# Patient Record
Sex: FEMALE | Race: BLACK OR AFRICAN AMERICAN | NOT HISPANIC OR LATINO | Employment: UNEMPLOYED | URBAN - METROPOLITAN AREA
[De-identification: names, ages, dates, MRNs, and addresses within clinical notes are randomized per-mention and may not be internally consistent; named-entity substitution may affect disease eponyms.]

---

## 2023-09-24 LAB
ACTIVATED PARTIAL THROMBOPLASTIN TIME IN PPP BY COAGULATION ASSAY: 24 SEC (ref 27–38)
ACTIVATED PARTIAL THROMBOPLASTIN TIME IN PPP BY COAGULATION ASSAY: 31 SEC (ref 27–38)
ALANINE AMINOTRANSFERASE (SGPT) (U/L) IN SER/PLAS: 22 U/L (ref 7–45)
ALBUMIN (G/DL) IN SER/PLAS: 3.6 G/DL (ref 3.4–5)
ALKALINE PHOSPHATASE (U/L) IN SER/PLAS: 62 U/L (ref 33–110)
ANION GAP IN SER/PLAS: 15 MMOL/L (ref 10–20)
ASPARTATE AMINOTRANSFERASE (SGOT) (U/L) IN SER/PLAS: 21 U/L (ref 9–39)
BASOPHILS (10*3/UL) IN BLOOD BY AUTOMATED COUNT: 0.02 X10E9/L (ref 0–0.1)
BASOPHILS/100 LEUKOCYTES IN BLOOD BY AUTOMATED COUNT: 0.3 % (ref 0–2)
BILIRUBIN TOTAL (MG/DL) IN SER/PLAS: 0.5 MG/DL (ref 0–1.2)
CALCIUM (MG/DL) IN SER/PLAS: 8.6 MG/DL (ref 8.6–10.6)
CARBON DIOXIDE, TOTAL (MMOL/L) IN SER/PLAS: 23 MMOL/L (ref 21–32)
CHLORIDE (MMOL/L) IN SER/PLAS: 110 MMOL/L (ref 98–107)
CREATININE (MG/DL) IN SER/PLAS: 0.79 MG/DL (ref 0.5–1.05)
D-DIMER, QUANTITATIVE VTE EXCLUSION: NORMAL
EOSINOPHILS (10*3/UL) IN BLOOD BY AUTOMATED COUNT: 0.31 X10E9/L (ref 0–0.7)
EOSINOPHILS/100 LEUKOCYTES IN BLOOD BY AUTOMATED COUNT: 4.3 % (ref 0–6)
ERYTHROCYTE DISTRIBUTION WIDTH (RATIO) BY AUTOMATED COUNT: 14.6 % (ref 11.5–14.5)
ERYTHROCYTE MEAN CORPUSCULAR HEMOGLOBIN CONCENTRATION (G/DL) BY AUTOMATED: 33.1 G/DL (ref 32–36)
ERYTHROCYTE MEAN CORPUSCULAR VOLUME (FL) BY AUTOMATED COUNT: 81 FL (ref 80–100)
ERYTHROCYTES (10*6/UL) IN BLOOD BY AUTOMATED COUNT: 3.97 X10E12/L (ref 4–5.2)
FIBRIN D-DIMER (NG/ML FEU) IN PLATELET POOR PLASMA: 1149 NG/ML FEU
FLU A RESULT: NOT DETECTED
FLU B RESULT: NOT DETECTED
GFR FEMALE: 88 ML/MIN/1.73M2
GLUCOSE (MG/DL) IN SER/PLAS: 106 MG/DL (ref 74–99)
HEMATOCRIT (%) IN BLOOD BY AUTOMATED COUNT: 32 % (ref 36–46)
HEMOGLOBIN (G/DL) IN BLOOD: 10.6 G/DL (ref 12–16)
IMMATURE GRANULOCYTES/100 LEUKOCYTES IN BLOOD BY AUTOMATED COUNT: 0.4 % (ref 0–0.9)
INR IN PPP BY COAGULATION ASSAY: 1 (ref 0.9–1.1)
INR IN PPP BY COAGULATION ASSAY: 1.1 (ref 0.9–1.1)
LEUKOCYTES (10*3/UL) IN BLOOD BY AUTOMATED COUNT: 7.2 X10E9/L (ref 4.4–11.3)
LYMPHOCYTES (10*3/UL) IN BLOOD BY AUTOMATED COUNT: 2.32 X10E9/L (ref 1.2–4.8)
LYMPHOCYTES/100 LEUKOCYTES IN BLOOD BY AUTOMATED COUNT: 32.1 % (ref 13–44)
MAGNESIUM (MG/DL) IN SER/PLAS: 1.97 MG/DL (ref 1.6–2.4)
MONOCYTES (10*3/UL) IN BLOOD BY AUTOMATED COUNT: 0.62 X10E9/L (ref 0.1–1)
MONOCYTES/100 LEUKOCYTES IN BLOOD BY AUTOMATED COUNT: 8.6 % (ref 2–10)
NATRIURETIC PEPTIDE B (PG/ML) IN SER/PLAS: 90 PG/ML (ref 0–99)
NEUTROPHILS (10*3/UL) IN BLOOD BY AUTOMATED COUNT: 3.93 X10E9/L (ref 1.2–7.7)
NEUTROPHILS/100 LEUKOCYTES IN BLOOD BY AUTOMATED COUNT: 54.3 % (ref 40–80)
NRBC (PER 100 WBCS) BY AUTOMATED COUNT: 0 /100 WBC (ref 0–0)
PATIENT TEMPERATURE: 37 DEGREES C
PLATELETS (10*3/UL) IN BLOOD AUTOMATED COUNT: 299 X10E9/L (ref 150–450)
POCT ANION GAP, VENOUS: 10 MMOL/L (ref 10–25)
POCT BASE EXCESS, VENOUS: 1.1 MMOL/L (ref -2–3)
POCT CHLORIDE, VENOUS: 108 MMOL/L (ref 98–107)
POCT GLUCOSE, VENOUS: 114 MG/DL (ref 74–99)
POCT HCO3, VENOUS: 27.1 MMOL/L (ref 22–26)
POCT HEMATOCRIT, VENOUS: 34 % (ref 36–46)
POCT HEMOGLOBIN, VENOUS: 11.3 G/DL (ref 12–16)
POCT IONIZED CALCIUM, VENOUS: 1.17 MMOL/L (ref 1.1–1.33)
POCT LACTATE, VENOUS: 1.3 MMOL/L (ref 0.4–2)
POCT OXY HEMOGLOBIN, VENOUS: 68.1 % (ref 45–75)
POCT PCO2, VENOUS: 48 MMHG (ref 41–51)
POCT PH, VENOUS: 7.36 (ref 7.33–7.43)
POCT PO2, VENOUS: 44 MMHG (ref 35–45)
POCT POTASSIUM, VENOUS: 3.8 MMOL/L (ref 3.5–5.3)
POCT SO2, VENOUS: 70 % (ref 45–75)
POCT SODIUM, VENOUS: 141 MMOL/L (ref 136–145)
POTASSIUM (MMOL/L) IN SER/PLAS: 3.7 MMOL/L (ref 3.5–5.3)
PROTEIN TOTAL: 6.7 G/DL (ref 6.4–8.2)
PROTHROMBIN TIME (PT) IN PPP BY COAGULATION ASSAY: 11.7 SEC (ref 9.8–12.8)
PROTHROMBIN TIME (PT) IN PPP BY COAGULATION ASSAY: 11.9 SEC (ref 9.8–12.8)
SARS-COV-2 RESULT: NOT DETECTED
SODIUM (MMOL/L) IN SER/PLAS: 144 MMOL/L (ref 136–145)
TROPONIN I, HIGH SENSITIVITY: 4 NG/L (ref 0–34)
TROPONIN I, HIGH SENSITIVITY: 6 NG/L (ref 0–34)
TROPONIN I, HIGH SENSITIVITY: NORMAL
UREA NITROGEN (MG/DL) IN SER/PLAS: 12 MG/DL (ref 6–23)

## 2023-09-25 ENCOUNTER — HOSPITAL ENCOUNTER (INPATIENT)
Dept: DATA CONVERSION | Facility: HOSPITAL | Age: 58
LOS: 3 days | Discharge: HOME | End: 2023-09-28
Attending: EMERGENCY MEDICINE | Admitting: STUDENT IN AN ORGANIZED HEALTH CARE EDUCATION/TRAINING PROGRAM
Payer: COMMERCIAL

## 2023-09-25 DIAGNOSIS — R06.02 SHORTNESS OF BREATH: ICD-10-CM

## 2023-09-25 DIAGNOSIS — R06.00 DYSPNEA, UNSPECIFIED: ICD-10-CM

## 2023-09-25 DIAGNOSIS — M79.669 PAIN IN UNSPECIFIED LOWER LEG: ICD-10-CM

## 2023-09-25 DIAGNOSIS — R10.84 GENERALIZED ABDOMINAL PAIN: ICD-10-CM

## 2023-09-25 LAB
ALBUMIN (G/DL) IN SER/PLAS: NORMAL
ANION GAP IN SER/PLAS: NORMAL
ATRIAL RATE: 83 BPM
CALCIUM (MG/DL) IN SER/PLAS: NORMAL
CARBON DIOXIDE, TOTAL (MMOL/L) IN SER/PLAS: NORMAL
CHLORIDE (MMOL/L) IN SER/PLAS: NORMAL
CREATININE (MG/DL) IN SER/PLAS: NORMAL
ERYTHROCYTE DISTRIBUTION WIDTH (RATIO) BY AUTOMATED COUNT: NORMAL
ERYTHROCYTE MEAN CORPUSCULAR HEMOGLOBIN CONCENTRATION (G/DL) BY AUTOMATED: NORMAL
ERYTHROCYTE MEAN CORPUSCULAR VOLUME (FL) BY AUTOMATED COUNT: NORMAL
ERYTHROCYTES (10*6/UL) IN BLOOD BY AUTOMATED COUNT: NORMAL
GFR FEMALE: NORMAL
GFR MALE: NORMAL
GLUCOSE (MG/DL) IN SER/PLAS: NORMAL
HEMATOCRIT (%) IN BLOOD BY AUTOMATED COUNT: NORMAL
HEMOGLOBIN (G/DL) IN BLOOD: NORMAL
LEUKOCYTES (10*3/UL) IN BLOOD BY AUTOMATED COUNT: NORMAL
MAGNESIUM (MG/DL) IN SER/PLAS: NORMAL
NRBC (PER 100 WBCS) BY AUTOMATED COUNT: NORMAL
P AXIS: 50 DEGREES
P OFFSET: 213 MS
P ONSET: 152 MS
PHOSPHATE (MG/DL) IN SER/PLAS: NORMAL
PLATELETS (10*3/UL) IN BLOOD AUTOMATED COUNT: NORMAL
POCT GLUCOSE: 203 MG/DL (ref 74–99)
POTASSIUM (MMOL/L) IN SER/PLAS: NORMAL
PR INTERVAL: 152 MS
Q ONSET: 228 MS
QRS COUNT: 13 BEATS
QRS DURATION: 96 MS
QT INTERVAL: 384 MS
QTC CALCULATION(BAZETT): 451 MS
QTC FREDERICIA: 428 MS
R AXIS: -23 DEGREES
SODIUM (MMOL/L) IN SER/PLAS: NORMAL
T AXIS: 18 DEGREES
T OFFSET: 420 MS
UREA NITROGEN (MG/DL) IN SER/PLAS: NORMAL
VENTRICULAR RATE: 83 BPM

## 2023-09-26 LAB
ALBUMIN (G/DL) IN SER/PLAS: 3.9 G/DL (ref 3.4–5)
ALBUMIN (G/DL) IN SER/PLAS: 4 G/DL (ref 3.4–5)
ANION GAP IN SER/PLAS: 12 MMOL/L (ref 10–20)
ANION GAP IN SER/PLAS: 14 MMOL/L (ref 10–20)
BASOPHILS (10*3/UL) IN BLOOD BY AUTOMATED COUNT: 0.03 X10E9/L (ref 0–0.1)
BASOPHILS/100 LEUKOCYTES IN BLOOD BY AUTOMATED COUNT: 0.3 % (ref 0–2)
CALCIUM (MG/DL) IN SER/PLAS: 9 MG/DL (ref 8.6–10.6)
CALCIUM (MG/DL) IN SER/PLAS: 9.2 MG/DL (ref 8.6–10.6)
CARBON DIOXIDE, TOTAL (MMOL/L) IN SER/PLAS: 30 MMOL/L (ref 21–32)
CARBON DIOXIDE, TOTAL (MMOL/L) IN SER/PLAS: 30 MMOL/L (ref 21–32)
CHLORIDE (MMOL/L) IN SER/PLAS: 102 MMOL/L (ref 98–107)
CHLORIDE (MMOL/L) IN SER/PLAS: 103 MMOL/L (ref 98–107)
CREATININE (MG/DL) IN SER/PLAS: 1.01 MG/DL (ref 0.5–1.05)
CREATININE (MG/DL) IN SER/PLAS: 1.08 MG/DL (ref 0.5–1.05)
EOSINOPHILS (10*3/UL) IN BLOOD BY AUTOMATED COUNT: 0.21 X10E9/L (ref 0–0.7)
EOSINOPHILS/100 LEUKOCYTES IN BLOOD BY AUTOMATED COUNT: 2.1 % (ref 0–6)
ERYTHROCYTE DISTRIBUTION WIDTH (RATIO) BY AUTOMATED COUNT: 15 % (ref 11.5–14.5)
ERYTHROCYTE DISTRIBUTION WIDTH (RATIO) BY AUTOMATED COUNT: 15.2 % (ref 11.5–14.5)
ERYTHROCYTE MEAN CORPUSCULAR HEMOGLOBIN CONCENTRATION (G/DL) BY AUTOMATED: 31.2 G/DL (ref 32–36)
ERYTHROCYTE MEAN CORPUSCULAR HEMOGLOBIN CONCENTRATION (G/DL) BY AUTOMATED: 31.5 G/DL (ref 32–36)
ERYTHROCYTE MEAN CORPUSCULAR VOLUME (FL) BY AUTOMATED COUNT: 85 FL (ref 80–100)
ERYTHROCYTE MEAN CORPUSCULAR VOLUME (FL) BY AUTOMATED COUNT: 87 FL (ref 80–100)
ERYTHROCYTES (10*6/UL) IN BLOOD BY AUTOMATED COUNT: 4.03 X10E12/L (ref 4–5.2)
ERYTHROCYTES (10*6/UL) IN BLOOD BY AUTOMATED COUNT: 4.05 X10E12/L (ref 4–5.2)
ESTIMATED AVERAGE GLUCOSE FOR HBA1C: 157 MG/DL
GFR FEMALE: 60 ML/MIN/1.73M2
GFR FEMALE: 65 ML/MIN/1.73M2
GLUCOSE (MG/DL) IN SER/PLAS: 147 MG/DL (ref 74–99)
GLUCOSE (MG/DL) IN SER/PLAS: 95 MG/DL (ref 74–99)
HEMATOCRIT (%) IN BLOOD BY AUTOMATED COUNT: 34.3 % (ref 36–46)
HEMATOCRIT (%) IN BLOOD BY AUTOMATED COUNT: 35.3 % (ref 36–46)
HEMOGLOBIN (G/DL) IN BLOOD: 10.8 G/DL (ref 12–16)
HEMOGLOBIN (G/DL) IN BLOOD: 11 G/DL (ref 12–16)
HEMOGLOBIN A1C/HEMOGLOBIN TOTAL IN BLOOD: 7.1 %
IMMATURE GRANULOCYTES/100 LEUKOCYTES IN BLOOD BY AUTOMATED COUNT: 0.5 % (ref 0–0.9)
LEUKOCYTES (10*3/UL) IN BLOOD BY AUTOMATED COUNT: 9.8 X10E9/L (ref 4.4–11.3)
LEUKOCYTES (10*3/UL) IN BLOOD BY AUTOMATED COUNT: 9.9 X10E9/L (ref 4.4–11.3)
LYMPHOCYTES (10*3/UL) IN BLOOD BY AUTOMATED COUNT: 1.94 X10E9/L (ref 1.2–4.8)
LYMPHOCYTES/100 LEUKOCYTES IN BLOOD BY AUTOMATED COUNT: 19.7 % (ref 13–44)
MAGNESIUM (MG/DL) IN SER/PLAS: 2.19 MG/DL (ref 1.6–2.4)
MAGNESIUM (MG/DL) IN SER/PLAS: 2.21 MG/DL (ref 1.6–2.4)
MONOCYTES (10*3/UL) IN BLOOD BY AUTOMATED COUNT: 0.7 X10E9/L (ref 0.1–1)
MONOCYTES/100 LEUKOCYTES IN BLOOD BY AUTOMATED COUNT: 7.1 % (ref 2–10)
NEUTROPHILS (10*3/UL) IN BLOOD BY AUTOMATED COUNT: 6.94 X10E9/L (ref 1.2–7.7)
NEUTROPHILS/100 LEUKOCYTES IN BLOOD BY AUTOMATED COUNT: 70.3 % (ref 40–80)
NRBC (PER 100 WBCS) BY AUTOMATED COUNT: 0 /100 WBC (ref 0–0)
NRBC (PER 100 WBCS) BY AUTOMATED COUNT: 0 /100 WBC (ref 0–0)
PHOSPHATE (MG/DL) IN SER/PLAS: 4.9 MG/DL (ref 2.5–4.9)
PHOSPHATE (MG/DL) IN SER/PLAS: 5.1 MG/DL (ref 2.5–4.9)
PLATELETS (10*3/UL) IN BLOOD AUTOMATED COUNT: 339 X10E9/L (ref 150–450)
PLATELETS (10*3/UL) IN BLOOD AUTOMATED COUNT: 340 X10E9/L (ref 150–450)
POCT GLUCOSE: 120 MG/DL (ref 74–99)
POCT GLUCOSE: 139 MG/DL (ref 74–99)
POCT GLUCOSE: 140 MG/DL (ref 74–99)
POCT GLUCOSE: 237 MG/DL (ref 74–99)
POTASSIUM (MMOL/L) IN SER/PLAS: 3.8 MMOL/L (ref 3.5–5.3)
POTASSIUM (MMOL/L) IN SER/PLAS: 4 MMOL/L (ref 3.5–5.3)
SODIUM (MMOL/L) IN SER/PLAS: 140 MMOL/L (ref 136–145)
SODIUM (MMOL/L) IN SER/PLAS: 143 MMOL/L (ref 136–145)
UREA NITROGEN (MG/DL) IN SER/PLAS: 17 MG/DL (ref 6–23)
UREA NITROGEN (MG/DL) IN SER/PLAS: 17 MG/DL (ref 6–23)

## 2023-09-27 LAB
ABO GROUP (TYPE) IN BLOOD: NORMAL
ACTIVATED PARTIAL THROMBOPLASTIN TIME IN PPP BY COAGULATION ASSAY: 33 SEC (ref 27–38)
ALBUMIN (G/DL) IN SER/PLAS: 4 G/DL (ref 3.4–5)
ANION GAP IN SER/PLAS: 15 MMOL/L (ref 10–20)
ANTIBODY SCREEN: NORMAL
BASOPHILS (10*3/UL) IN BLOOD BY AUTOMATED COUNT: 0.03 X10E9/L (ref 0–0.1)
BASOPHILS (10*3/UL) IN BLOOD BY AUTOMATED COUNT: NORMAL
BASOPHILS/100 LEUKOCYTES IN BLOOD BY AUTOMATED COUNT: 0.3 % (ref 0–2)
BASOPHILS/100 LEUKOCYTES IN BLOOD BY AUTOMATED COUNT: NORMAL
CALCIUM (MG/DL) IN SER/PLAS: 9.1 MG/DL (ref 8.6–10.6)
CARBON DIOXIDE, TOTAL (MMOL/L) IN SER/PLAS: 28 MMOL/L (ref 21–32)
CELLS COUNTED TOTAL (#) IN BODY FLUID: 100
CHLORIDE (MMOL/L) IN SER/PLAS: 102 MMOL/L (ref 98–107)
CLARITY FLUID: CLEAR
COLOR OF BODY FLUID: COLORLESS
CREATININE (MG/DL) IN SER/PLAS: 0.96 MG/DL (ref 0.5–1.05)
EOSINOPHILS (10*3/UL) IN BLOOD BY AUTOMATED COUNT: 0.19 X10E9/L (ref 0–0.7)
EOSINOPHILS (10*3/UL) IN BLOOD BY AUTOMATED COUNT: NORMAL
EOSINOPHILS/100 LEUKOCYTES IN BLOOD BY AUTOMATED COUNT: 2.1 % (ref 0–6)
EOSINOPHILS/100 LEUKOCYTES IN BLOOD BY AUTOMATED COUNT: NORMAL
EOSINOPHILS/100 LEUKOCYTES IN BODY FLUID BY MANUAL COUNT: 1 %
ERYTHROCYTE DISTRIBUTION WIDTH (RATIO) BY AUTOMATED COUNT: 15.4 % (ref 11.5–14.5)
ERYTHROCYTE DISTRIBUTION WIDTH (RATIO) BY AUTOMATED COUNT: NORMAL
ERYTHROCYTE MEAN CORPUSCULAR HEMOGLOBIN CONCENTRATION (G/DL) BY AUTOMATED: 31.6 G/DL (ref 32–36)
ERYTHROCYTE MEAN CORPUSCULAR HEMOGLOBIN CONCENTRATION (G/DL) BY AUTOMATED: NORMAL
ERYTHROCYTE MEAN CORPUSCULAR VOLUME (FL) BY AUTOMATED COUNT: 86 FL (ref 80–100)
ERYTHROCYTE MEAN CORPUSCULAR VOLUME (FL) BY AUTOMATED COUNT: NORMAL
ERYTHROCYTES (/UL) IN BODY FLUID: 0 /UL
ERYTHROCYTES (10*6/UL) IN BLOOD BY AUTOMATED COUNT: 4 X10E12/L (ref 4–5.2)
ERYTHROCYTES (10*6/UL) IN BLOOD BY AUTOMATED COUNT: NORMAL
GFR FEMALE: 69 ML/MIN/1.73M2
GLUCOSE (MG/DL) IN SER/PLAS: 102 MG/DL (ref 74–99)
HEMATOCRIT (%) IN BLOOD BY AUTOMATED COUNT: 34.2 % (ref 36–46)
HEMATOCRIT (%) IN BLOOD BY AUTOMATED COUNT: NORMAL
HEMOGLOBIN (G/DL) IN BLOOD: 10.8 G/DL (ref 12–16)
HEMOGLOBIN (G/DL) IN BLOOD: NORMAL
IMMATURE GRANULOCYTES/100 LEUKOCYTES IN BLOOD BY AUTOMATED COUNT: 0.4 % (ref 0–0.9)
IMMATURE GRANULOCYTES/100 LEUKOCYTES IN BLOOD BY AUTOMATED COUNT: NORMAL
INR IN PPP BY COAGULATION ASSAY: 1 (ref 0.9–1.1)
LEUKOCYTES (/UL) IN BODY FLUID: 58 /UL
LEUKOCYTES (10*3/UL) IN BLOOD BY AUTOMATED COUNT: 9.2 X10E9/L (ref 4.4–11.3)
LEUKOCYTES (10*3/UL) IN BLOOD BY AUTOMATED COUNT: NORMAL
LYMPHOCYTES (10*3/UL) IN BLOOD BY AUTOMATED COUNT: 2.6 X10E9/L (ref 1.2–4.8)
LYMPHOCYTES (10*3/UL) IN BLOOD BY AUTOMATED COUNT: NORMAL
LYMPHOCYTES/100 LEUKOCYTES IN BLOOD BY AUTOMATED COUNT: 28.4 % (ref 13–44)
LYMPHOCYTES/100 LEUKOCYTES IN BLOOD BY AUTOMATED COUNT: NORMAL
LYMPHOCYTES/100 LEUKOCYTES IN BODY FLUID BY MAN CT: 25 %
MAGNESIUM (MG/DL) IN SER/PLAS: 2.16 MG/DL (ref 1.6–2.4)
MANUAL DIFFERENTIAL Y/N: NORMAL
MONOCYTES (10*3/UL) IN BLOOD BY AUTOMATED COUNT: 0.77 X10E9/L (ref 0.1–1)
MONOCYTES (10*3/UL) IN BLOOD BY AUTOMATED COUNT: NORMAL
MONOCYTES+MACROPHAGES/100 WBC IN BODY FLUID BY MAN CT: 71 %
MONOCYTES/100 LEUKOCYTES IN BLOOD BY AUTOMATED COUNT: 8.4 % (ref 2–10)
MONOCYTES/100 LEUKOCYTES IN BLOOD BY AUTOMATED COUNT: NORMAL
NEUTROPHILS (10*3/UL) IN BLOOD BY AUTOMATED COUNT: 5.53 X10E9/L (ref 1.2–7.7)
NEUTROPHILS (10*3/UL) IN BLOOD BY AUTOMATED COUNT: NORMAL
NEUTROPHILS/100 LEUKOCYTES IN BLOOD BY AUTOMATED COUNT: 60.4 % (ref 40–80)
NEUTROPHILS/100 LEUKOCYTES IN BLOOD BY AUTOMATED COUNT: NORMAL
NEUTROPHILS/100 LEUKOCYTES IN BODY FLUID BY MANUAL COUNT: 3 %
NRBC (PER 100 WBCS) BY AUTOMATED COUNT: 0 /100 WBC (ref 0–0)
NRBC (PER 100 WBCS) BY AUTOMATED COUNT: NORMAL
PHOSPHATE (MG/DL) IN SER/PLAS: 5.1 MG/DL (ref 2.5–4.9)
PLATELETS (10*3/UL) IN BLOOD AUTOMATED COUNT: 347 X10E9/L (ref 150–450)
PLATELETS (10*3/UL) IN BLOOD AUTOMATED COUNT: NORMAL
POCT GLUCOSE: 127 MG/DL (ref 74–99)
POCT GLUCOSE: 151 MG/DL (ref 74–99)
POCT GLUCOSE: 156 MG/DL (ref 74–99)
POCT GLUCOSE: 166 MG/DL (ref 74–99)
POCT GLUCOSE: 97 MG/DL (ref 74–99)
POTASSIUM (MMOL/L) IN SER/PLAS: 3.7 MMOL/L (ref 3.5–5.3)
PROTHROMBIN TIME (PT) IN PPP BY COAGULATION ASSAY: 11.5 SEC (ref 9.8–12.8)
RH FACTOR: NORMAL
SODIUM (MMOL/L) IN SER/PLAS: 141 MMOL/L (ref 136–145)
UREA NITROGEN (MG/DL) IN SER/PLAS: 20 MG/DL (ref 6–23)

## 2023-09-28 VITALS — WEIGHT: 293 LBS | BODY MASS INDEX: 51.91 KG/M2 | HEIGHT: 63 IN

## 2023-09-28 LAB
ALANINE AMINOTRANSFERASE (SGPT) (U/L) IN SER/PLAS: 29 U/L (ref 7–45)
ALBUMIN (G/DL) IN SER/PLAS: 3.8 G/DL (ref 3.4–5)
ALBUMIN (G/DL) IN SER/PLAS: 3.8 G/DL (ref 3.4–5)
ALKALINE PHOSPHATASE (U/L) IN SER/PLAS: 59 U/L (ref 33–110)
ANION GAP IN SER/PLAS: 13 MMOL/L (ref 10–20)
ASPARTATE AMINOTRANSFERASE (SGOT) (U/L) IN SER/PLAS: 16 U/L (ref 9–39)
BASOPHILS (10*3/UL) IN BLOOD BY AUTOMATED COUNT: 0.03 X10E9/L (ref 0–0.1)
BASOPHILS/100 LEUKOCYTES IN BLOOD BY AUTOMATED COUNT: 0.3 % (ref 0–2)
BILIRUBIN DIRECT (MG/DL) IN SER/PLAS: 0.1 MG/DL (ref 0–0.3)
BILIRUBIN TOTAL (MG/DL) IN SER/PLAS: 0.4 MG/DL (ref 0–1.2)
CALCIUM (MG/DL) IN SER/PLAS: 9 MG/DL (ref 8.6–10.6)
CARBON DIOXIDE, TOTAL (MMOL/L) IN SER/PLAS: 29 MMOL/L (ref 21–32)
CD4/CD8 RATIO: 8.67 (ref 1–3.5)
CD45%: 100 %
CHLORIDE (MMOL/L) IN SER/PLAS: 103 MMOL/L (ref 98–107)
CP CD3+CD4+%: 78 % (ref 29–57)
CP CD3+CD8+%: 9 % (ref 7–31)
CREATININE (MG/DL) IN SER/PLAS: 1.05 MG/DL (ref 0.5–1.05)
EOSINOPHILS (10*3/UL) IN BLOOD BY AUTOMATED COUNT: 0.15 X10E9/L (ref 0–0.7)
EOSINOPHILS/100 LEUKOCYTES IN BLOOD BY AUTOMATED COUNT: 1.5 % (ref 0–6)
ERYTHROCYTE DISTRIBUTION WIDTH (RATIO) BY AUTOMATED COUNT: 15.4 % (ref 11.5–14.5)
ERYTHROCYTE MEAN CORPUSCULAR HEMOGLOBIN CONCENTRATION (G/DL) BY AUTOMATED: 31.3 G/DL (ref 32–36)
ERYTHROCYTE MEAN CORPUSCULAR VOLUME (FL) BY AUTOMATED COUNT: 87 FL (ref 80–100)
ERYTHROCYTES (10*6/UL) IN BLOOD BY AUTOMATED COUNT: 3.87 X10E12/L (ref 4–5.2)
FMETH: ABNORMAL
FSIT1: ABNORMAL
GFR FEMALE: 62 ML/MIN/1.73M2
GLUCOSE (MG/DL) IN SER/PLAS: 120 MG/DL (ref 74–99)
HEMATOCRIT (%) IN BLOOD BY AUTOMATED COUNT: 33.6 % (ref 36–46)
HEMOGLOBIN (G/DL) IN BLOOD: 10.5 G/DL (ref 12–16)
IMMATURE GRANULOCYTES/100 LEUKOCYTES IN BLOOD BY AUTOMATED COUNT: 0.4 % (ref 0–0.9)
LEUKOCYTES (10*3/UL) IN BLOOD BY AUTOMATED COUNT: 10 X10E9/L (ref 4.4–11.3)
LYMPHOCYTES (10*3/UL) IN BLOOD BY AUTOMATED COUNT: 3 X10E9/L (ref 1.2–4.8)
LYMPHOCYTES/100 LEUKOCYTES IN BLOOD BY AUTOMATED COUNT: 30.1 % (ref 13–44)
MAGNESIUM (MG/DL) IN SER/PLAS: 2.13 MG/DL (ref 1.6–2.4)
MARKER INTERPRETATION: ABNORMAL
MONOCYTES (10*3/UL) IN BLOOD BY AUTOMATED COUNT: 1.08 X10E9/L (ref 0.1–1)
MONOCYTES/100 LEUKOCYTES IN BLOOD BY AUTOMATED COUNT: 10.8 % (ref 2–10)
NEUTROPHILS (10*3/UL) IN BLOOD BY AUTOMATED COUNT: 5.68 X10E9/L (ref 1.2–7.7)
NEUTROPHILS/100 LEUKOCYTES IN BLOOD BY AUTOMATED COUNT: 56.9 % (ref 40–80)
NRBC (PER 100 WBCS) BY AUTOMATED COUNT: 0 /100 WBC (ref 0–0)
PHOSPHATE (MG/DL) IN SER/PLAS: 5 MG/DL (ref 2.5–4.9)
PLATELETS (10*3/UL) IN BLOOD AUTOMATED COUNT: 356 X10E9/L (ref 150–450)
POCT GLUCOSE: 106 MG/DL (ref 74–99)
POCT GLUCOSE: 161 MG/DL (ref 74–99)
POCT GLUCOSE: 186 MG/DL (ref 74–99)
POTASSIUM (MMOL/L) IN SER/PLAS: 4 MMOL/L (ref 3.5–5.3)
PROTEIN TOTAL: 6.6 G/DL (ref 6.4–8.2)
SODIUM (MMOL/L) IN SER/PLAS: 141 MMOL/L (ref 136–145)
UREA NITROGEN (MG/DL) IN SER/PLAS: 19 MG/DL (ref 6–23)

## 2023-09-28 RX ORDER — BUDESONIDE 0.5 MG/2ML
0.5 INHALANT ORAL EVERY 12 HOURS
Status: DISCONTINUED | OUTPATIENT
Start: 2023-09-30 | End: 2023-09-28 | Stop reason: HOSPADM

## 2023-09-28 RX ORDER — ENOXAPARIN SODIUM 100 MG/ML
40 INJECTION SUBCUTANEOUS EVERY 24 HOURS
Status: DISCONTINUED | OUTPATIENT
Start: 2023-09-30 | End: 2023-09-28 | Stop reason: HOSPADM

## 2023-09-28 RX ORDER — LOSARTAN POTASSIUM 50 MG/1
25 TABLET ORAL DAILY
Status: DISCONTINUED | OUTPATIENT
Start: 2023-09-30 | End: 2023-09-28 | Stop reason: HOSPADM

## 2023-09-28 RX ORDER — PREGABALIN 50 MG/1
200 CAPSULE ORAL DAILY
Status: DISCONTINUED | OUTPATIENT
Start: 2023-09-30 | End: 2023-09-28 | Stop reason: HOSPADM

## 2023-09-28 RX ORDER — ACETAMINOPHEN 325 MG/1
650 TABLET ORAL EVERY 6 HOURS
Status: DISCONTINUED | OUTPATIENT
Start: 2023-09-30 | End: 2023-09-28 | Stop reason: HOSPADM

## 2023-09-28 RX ORDER — METOPROLOL TARTRATE 50 MG/1
100 TABLET ORAL 2 TIMES DAILY
Status: DISCONTINUED | OUTPATIENT
Start: 2023-09-30 | End: 2023-09-28 | Stop reason: HOSPADM

## 2023-09-28 RX ORDER — CELECOXIB 200 MG/1
200 CAPSULE ORAL DAILY
Status: DISCONTINUED | OUTPATIENT
Start: 2023-09-30 | End: 2023-09-28 | Stop reason: HOSPADM

## 2023-09-28 RX ORDER — PREDNISONE 20 MG/1
40 TABLET ORAL DAILY
Status: DISCONTINUED | OUTPATIENT
Start: 2023-09-30 | End: 2023-09-28 | Stop reason: HOSPADM

## 2023-09-28 RX ORDER — IPRATROPIUM BROMIDE AND ALBUTEROL SULFATE 2.5; .5 MG/3ML; MG/3ML
3 SOLUTION RESPIRATORY (INHALATION) EVERY 2 HOUR PRN
Status: DISCONTINUED | OUTPATIENT
Start: 2023-09-30 | End: 2023-09-28 | Stop reason: HOSPADM

## 2023-09-28 RX ORDER — HYDROCHLOROTHIAZIDE 25 MG/1
25 TABLET ORAL DAILY
Status: DISCONTINUED | OUTPATIENT
Start: 2023-09-30 | End: 2023-09-28 | Stop reason: HOSPADM

## 2023-09-28 RX ORDER — NALOXONE HYDROCHLORIDE 0.4 MG/ML
0.2 INJECTION, SOLUTION INTRAMUSCULAR; INTRAVENOUS; SUBCUTANEOUS EVERY 5 MIN PRN
Status: DISCONTINUED | OUTPATIENT
Start: 2023-09-30 | End: 2023-09-28 | Stop reason: HOSPADM

## 2023-09-28 RX ORDER — DOCUSATE CALCIUM 240 MG
240 CAPSULE ORAL DAILY
Status: DISCONTINUED | OUTPATIENT
Start: 2023-09-30 | End: 2023-09-28 | Stop reason: HOSPADM

## 2023-09-28 RX ORDER — DEXTROSE 50 % IN WATER (D50W) INTRAVENOUS SYRINGE
25
Status: DISCONTINUED | OUTPATIENT
Start: 2023-09-30 | End: 2023-09-28 | Stop reason: HOSPADM

## 2023-09-28 RX ORDER — IPRATROPIUM BROMIDE AND ALBUTEROL SULFATE 2.5; .5 MG/3ML; MG/3ML
3 SOLUTION RESPIRATORY (INHALATION)
Status: DISCONTINUED | OUTPATIENT
Start: 2023-09-30 | End: 2023-09-28 | Stop reason: HOSPADM

## 2023-09-28 RX ORDER — ATORVASTATIN CALCIUM 20 MG/1
20 TABLET, FILM COATED ORAL EVERY 24 HOURS
Status: DISCONTINUED | OUTPATIENT
Start: 2023-09-30 | End: 2023-09-28 | Stop reason: HOSPADM

## 2023-09-28 RX ORDER — SIMETHICONE 80 MG
80 TABLET,CHEWABLE ORAL 4 TIMES DAILY PRN
Status: DISCONTINUED | OUTPATIENT
Start: 2023-09-30 | End: 2023-09-28 | Stop reason: HOSPADM

## 2023-09-28 RX ORDER — LIDOCAINE 560 MG/1
1 PATCH PERCUTANEOUS; TOPICAL; TRANSDERMAL EVERY 24 HOURS
Status: DISCONTINUED | OUTPATIENT
Start: 2023-09-30 | End: 2023-09-28 | Stop reason: HOSPADM

## 2023-09-28 RX ORDER — CALCIUM CARBONATE 200(500)MG
400 TABLET,CHEWABLE ORAL EVERY 2 HOUR PRN
Status: DISCONTINUED | OUTPATIENT
Start: 2023-09-30 | End: 2023-09-28 | Stop reason: HOSPADM

## 2023-09-28 RX ORDER — OXYCODONE HYDROCHLORIDE 5 MG/1
5 TABLET ORAL EVERY 6 HOURS PRN
Status: DISCONTINUED | OUTPATIENT
Start: 2023-09-30 | End: 2023-09-28 | Stop reason: HOSPADM

## 2023-09-28 RX ORDER — INSULIN LISPRO 100 [IU]/ML
0-10 INJECTION, SOLUTION INTRAVENOUS; SUBCUTANEOUS
Status: DISCONTINUED | OUTPATIENT
Start: 2023-09-30 | End: 2023-09-28 | Stop reason: HOSPADM

## 2023-09-29 LAB
CELL CT,FLUID PATH REVIEW: NORMAL
FCTOR: NORMAL
FCTSO: NORMAL
GRAM STAIN: NORMAL
RESPIRATORY CULTURE/SMEAR: NORMAL

## 2023-09-30 LAB
GRAM STAIN: NORMAL
TISSUE/WOUND CULTURE/SMEAR: NORMAL

## 2023-09-30 NOTE — H&P
History of Present Illness:   Pregnant/Lactating:  ·  Are You Pregnant no (1)   ·  Are You Currently Breastfeeding no (1)     HPI:    MRN: [id number]  CHIEF COMPLAINT  ====================================  SOB and mediastinal mass    HISTORY OF PRESENT ILLNESS  ====================================  Marian Zhou is a 59 yo female w/ PMHx asthma,  hypertension, T2DM, obesity, and hyperlipidemia presenting for a 1-2 week history of shortness of breath attributed to an asthma exacerbation admitted for further evaluation of mediastinal mass found incidentally on CT.     ED Course:  VS: T 36.2  /97 HR 90 RR 16 O2 97 on RA    Labs:  CBC WBC 7.2 Hb 10.6 Plt 299  RFP Na 144 K 3.7 Cl 110 HCO3 23 BUN 12 SCr 0.79 Glc 106 Ca 8.6 Mg 1.97   LFTs AST/ALT 21/22 Alk Phos 62 T Bili 0.5 Alb 3.6 TProt 6.7  Coags INR 1.1 PT 11.9 PTT 24  VBG pH 7.36  pCO2 48  pO2 44  SO2 70%  Lactate 1.3  Trops: 6 -> 4  COVID Negative  Flu Negative  D-dimer: 1149  BNP: 90    Imaging:   EKG 9/24 Sinus rhythm, normal axis, no interval prolongation, no ST elevation or depression. No previous EKG for comparison.  CXR 9/24 Impression: No acute cardiopulmonary process.   TH CT Angio Chest for PE 9/24: No evidence of acute PE. There is extensive intrathoracic lymphadenopathy, including mediastinal and bilateral  perihilar lymphadenopathy. Additionally, there is a large 3.6 x 3.4 cm x 4.0 cm soft tissue density within the mediastinum. This likely represents a conglomerate of right paratracheal nodes, however unable to exclude a soft tissue mass. An underlying  neoplastic process/lymphoproliferative disorder is not excluded.     Interventions:   In the ED, the patient was given Duoneb x1, albuterol nebulizer x1 and prednisone 40mg upon admission on 9/24. The patient reported still feeling short of breath despite this treatment as of the evening assessment on 9/24. While in the CDU she was maintained  on Duonebs q4h, budesonide q12h, prednisone 40  mg daily, and albuterol q2h prn. Again, despite these treatments patient did not report significant improvement in dyspnea.       On my interview, the patient appears in no acute distress. She reports feeling short of breath despite her nebulizer and steroid  treatments. The patient reports worsening shortness of breath over the last 1-2 weeks that has not improved with Symbicort. The patient also endorses night sweats that require her to change her sheets and interfere with her sleep at night. She denies  fevers, chills, unintended weight loss, and unintended weight gain. She also reports that she has felt hoarseness in her voice, the onset of which has coincided with her Duoneb treatment. The patient has also noticed facial swelling some time during the  past week, but cannot recall when. Of note, she was eating more salty foods and taking more alcohol this past week for birthday celebration.    The patient has not refilled her medications in a while, as she used to go to [facility] which is now closed.    Cardiac/GI Hx  Infectious/Oncologic Timeline:  2/20/23: new R Breast calcification on mammogram  3/3/23: indeterminate R breast calcifications in RUQ of breast  3/6/23: pathology of breast calcification showed secretory changes with associated calcifications and ectatic ducts      PAST MEDICAL HISTORY  ===================================  See above.    PAST SURGICAL HISTORY  ====================================  Carpal tunnel surgery, knee surgery, bilateral tubal ligation, R shoulder replacement 11/22    MEDICATIONS  ====================================  Celebrex 200 mg BID  HCTZ 12.5 mg daily  Pregabalin 200 mg daily  Atorvastatin 20 mg daily  Metformin 500 mg daily  Metoprolol tartrate 100 mg BID  Symbicort 1 puff BID  Albuterol 90 mcg/inh 2 puff q6h prn  Albuterols 2.5 mg/3mL q6h prn  Tramadol 50 mg q6h prn     ALLERGIES  ====================================  Penicillin; hives/urticaria    FAMILY  HX  ====================================  Mother: DM, RA, mastectomy for unknown reason    Daughter: RA    SOCIAL HX  ====================================  - Living Situation: Alone  - Functional Status: independent  - Tobacco: Denies  - Alcohol: rarely  - Drugs: Occasional marijuana use    REVIEW OF SYSTEMS:   ====================================  12 Point ROS otherwise negative    Comorbidities:   Comorbidites:  ·  Comorbid Conditions hypertension     Social History:   Social History:  Smoking Status never smoker (1)   Alcohol Use occasionally(1)   Drug Use denies (1)              Allergies:  ·  penicillin : Hives/Urticaria    Medications Prior to Admission:     CeleBREX 200 mg oral capsule: 1 cap(s) orally 2 times a day  hydroCHLOROthiazide 12.5 mg oral tablet: 1 tab(s) orally once a day  pregabalin 200 mg oral capsule: 1 cap(s) orally once a day     OARRS: 6/7/2023 30 caps/ 30 days   atorvastatin 20 mg oral tablet: 1 tab(s) orally once a day  metFORMIN 500 mg oral tablet: 1 tab(s) orally once a day  metoprolol tartrate 100 mg oral tablet: 1 tab(s) orally 2 times a day  Symbicort 160 mcg-4.5 mcg/inh inhalation aerosol: 1 puff(s) inhaled 2 times a day  albuterol 90 mcg/inh inhalation aerosol: 2 puff(s) inhaled every 6 hours, As Needed - for shortness of breath  albuterol 2.5 mg/3 mL (0.083%) inhalation solution: 3 milliliter(s) inhaled every 6 hours, As Needed - for shortness of breath  traMADol 50 mg oral tablet: 1 tab(s) orally every 6 hours, As Needed - for pain     OARRS 9/17/2023 30 tabs/ 8 days.    Objective:     Objective Information:      T   P  R  BP   MAP  SpO2   Value  36.4  75  16  155/93   115  98%  Date/Time 9/25 6:00 9/25 14:14 9/25 14:14 9/25 14:14  9/25 6:00 9/25 14:14  Range  (36.2C - 36.6C )  (60 - 90 )  (16 - 28 )  (155 - 207 )/ (88 - 123 )  (115 - 133 )  (91% - 99% )      Pain reported at 9/25 9:24: 8 = Severe    Physical Exam Narrative:  ·  Physical Exam:    General: awake, alert, conversant,  appears stated age  HENT: NCAT, PERRL, MMM  Eyes: no scleral icterus or conjunctival pallor   Skin: no suspect lesions or rashes noted on visible skin  Cardiac: RRR, normal S1, S2, no M/R/G  Pulm: CTAB, normal respiratory effort, on room air  Abdomen: soft, ND, NT, no involuntary guarding or rebound tenderness  : no flank pain  or indwelling urinary catheter  EXT: 1+ pitting edema to mid-shin in both legs, no asymmetry noted  MSK: no focal joint  swelling noted, sensation and strength intact 5/5 in all URE, LLE, LRE; ULE strength 4/5   Neuro: AOx3, able to follow commands, no gross FND  Psych: coherent thought process, appropriate mood and affect    Recent Lab Results:    Results:    CBC: 9/24/2023 11:02              \     Hgb     /                              \     10.6 L    /  WBC  ----------------  Plt               7.2       ----------------    299              /     Hct     \                              /     32.0 L    \            RBC: 3.97 L    MCV: 81     Neutrophil %: 54.3      CMP: 9/24/2023 11:02  NA+        Cl-     BUN  /                         144    110 H   12  /  --------------------------------  Glucose                ---------------------------  106 H    K+     HCO3-   Creat \                         3.7    23    0.79  \           \  T Bili  /                    \  0.5  /  AST  x ---- x ALT        21 x ---- x 22         /  Alk P   \               /  62  \  Calcium : 8.6     Anion Gap : 15     Albumin : 3.6     T Protein : 6.7           Coagulation: 9/24/2023 11:19  PT  /                    11.9  /  -------<    INR          ----------<      1.1  PTT\                    24 L \                       Radiology Results:    Results:        Conclusion:  Please see ED Provider Note for formal interpretation  Confirmed by Annabelle Garcia ([id number]) on 9/25/2023 6:14:30 AM     Electrocardiogram 12 Lead [Sep 25 2023  6:14AM]      Impression:    No sonographic evidence for deep vein thrombosis within  the evaluated  veins of the bilateral lower extremity.     MACRO:   US Venous Duplex Lower Extremity Veins Bilateral [Sep 25 2023  6:01AM]      Impression:     [No sonographic evidence for deep vein thrombosis within the evaluated veins of the [bilateral lower extremity].]    MACRO:  [ US Venous Duplex Lower Extremity Veins Bilateral [Sep 24 2023  9:56PM]      Impression:    1. No evidence of acute pulmonary embolism. Within the main pulmonary  artery or its branches to  proximal segmental level. Please note  that, assessment of distal segmental and subsegmental branches is  limited and small peripheral emboli are not entirely excluded. The  main pulmonary artery is prominent at 2.8 cm.  2. There is extensive intrathoracic lymphadenopathy,  includingmediastinal and bilateral perihilar lymphadenopathy.  Additionally, there is a large 3.6 x 3.4 cm x 4.0 cm soft tissue  density within the mediastinum. This likely represents a conglomerate  of right paratracheal nodes, however unable to exclude a soft tissue  mass. Lastly, there is bilateral pleural thickening,  left-greater-than-right. An underlying neoplastic  process/lymphoproliferative disorder is not excluded. Oncology  consult is recommended and PET-CT can be obtained as clinically  warranted.  3. Incidental note is made of a small amount of venous collaterals  within the anterior chest wall. There is no evidence of venous  thrombosis within the visualized venous structures, however this is  somewhat limited due to beam hardening artifact from contrast bolus  within the left subclavian vein.      TH CT Angio Chest for PE [Sep 24 2023  2:33PM]      Assessment and Plan:   Assessment:    Marian Zhou is a 59 yo female with PMHx asthma, hypertension, T2DM, obesity, and hyperlipidemia who is presenting for SOB and admitted  for SOB that has not subjectively improved with treatment and for workup of mediastinal mass. Differential for mediastinal mass includes  malignancy vs reactive lymphadenopathy vs infectious process. Low concern for infectious etiology given stable vitals,  no leukocytosis, and no evidence of definite infection on imaging. Shortness of breath ongoing is reassured by stable vitals and benign pulmonary exam.       #SOB   #Asthma  -  Patient received DuoNebs treatment, but reports that her symptoms return within a few hours  -  Patient started on steroids in the CDU  -  No increased work of breathing, wheezes, and is hemodynamically stable   -  9/24 CTPE: no acute PE, 9/25 b/l LE DVT US negative for DVT  -  SOB less likely 2/2 asthma exacerbation given lack of improvement with adequate treatment. Unlikely to be from PE given negative CT  and Duplex US findings, lack of O2 requirement. Suspect SOB could be  related to mediastinal mass  []    Continue DuoNebs, albuterol, and pulmicort nebulizers  []    Continue PO prenisone 40 mg daily iso SOB  #Mediastinal Mass  -  TH CT Angio 9/24 ruled out PE, but showed a large 3.6 x 3.4 cm x 4.0 cm soft tissue density within  the mediastinum  -  Patient reporting night sweats that require her to change her sheets and interfere with her sleep, but no fevers, chills, or unintended weight loss  -  Heme/Onc consulted for further evaluation  []    Heme/Onc to staff patient, appreciate rec?s    #HTN  -  Patient BP at 205/97 on admission, reports not taking her medications  -  Bilaterial 1+ pitting edema in lower extremities  [] C/w home HCTZ and metoprolol tartrate    #Diabetes  -  Patient has not been taking metformin per chart review  -  No HbA1c on record  []  HbA1c to be drawn with 9/26 AM Labs  [] Sliding scale insulin started     F: bolus PRN  E: replete K>4, Phos>3, Mg>2  N: Regular  A: PIV    DVT PPx: SCDs  GI PPx: N/A    CODE STATUS: Full Code (confirmed on admission)  SURROGATE DECISION MAKER: Christy Tang (oldest daughter) [phone]    Stella Melgar, MS3  [employer]        I personally saw, examined, and discussed the patient above. I have reviewed and agree with the above medical student note. All edits and/or correction have  been made directly into the note, including the physical exam and assessment/plan. Patient to be formally staffed in AM with attending.    Areli Boles MD  Internal Medicine PGY1      Attestation:   Note Completion:  I am a:  Resident/Fellow   Attending Attestation I saw and evaluated the patient.  I personally obtained the key and critical portions of the history and physical exam or was physically present for key and  critical portions performed by the resident/fellow. I reviewed the resident/fellow?s documentation and discussed the patient with the resident/fellow.  I agree with the resident/fellow?s medical decision making as documented in the note.     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  On admission found to be hypertensive with systolic blood pressures in the 200s.  Patient was receiving all of her care at [facility] however since it close down she has not  been able to obtain her prescriptions.  NAD had CT PE that was negative for PE.  Was started on DuoNeb, albuterol nebulizer, prednisone 40 mg, budesonide every 12 hours.  However patient with no improvement from subjective shortness of breath.    This morning stating her shortness of breath is better after undergoing treatments.  Denies any additional symptoms at this time.  Vital signs /95, heart rate 66, satting 95% on  room air, afebrile  Labs  "wnl.    Constitutional: in NAD  HEENT: sclerae anicteric, EOM grossly intact  CV: RRR, no murmurs noted  Pulm: CTAB, no increased WOB  GI: abd soft, NT, ND  Skin: warm and dry    Assessment and medical decision making  Asthma exacerbation-continue DuoNebs every 4-6 hours as needed, continue Pulmicort nebulizer, continue prednisone 40 mg (5 days total).  Mediastinal mass-oncology consultation for work-up, may need pulmonary consultation for potential bronchoscopy for biopsy versus PET scan.  Hypertension-Per chart review on hydrochlorothiazide 50 mg currently at 12.5 mg will increase to 25 mg twice daily    Dispo likely home ADOD 9/28          Electronic Signatures:  Tanya Sin)  (Signed 26-Sep-2023 16:32)   Authored: Note Completion   Co-Signer: History of Present Illness, Comorbidities, Social History, Allergies, Medications Prior to Admission, Objective, Assessment  and Plan, Note Completion  Areli Boles (Resident))  (Signed 25-Sep-2023 18:17)   Authored: History of Present Illness, Comorbidities,  Social History, Allergies, Medications Prior to Admission, Objective, Assessment and Plan, Note Completion      Last Updated: 26-Sep-2023 16:32 by Tanya Sin)    References:  1.  Data Referenced From \"History and Physical\" 24-Sep-2023 16:00   "

## 2023-09-30 NOTE — PROGRESS NOTES
Service: General Internal Medicine     Subjective Data:   FELIAP ROY is a 58 year old Female who is Hospital Day # 3.     RASHEEDA. This AM at bedside, patient states that her SOB has not improved nor gotten worse. She feels relief after her DuoNeb treatment for a few hours, but then her SOB returns. She states this is the  worst exacerbation she has experienced. Patient is also experiencing some abdominal pain which she attributes to gas.     Denies fevers, chills, chest pain.    Objective Data:     Objective Information:      T   P  R  BP   MAP  SpO2   Value  36.7  75  20  162/95   117  95%  Date/Time 9/26 8:03 9/26 8:03 9/26 8:03 9/26 8:03  9/26 8:03 9/26 8:03  Range  (36.4C - 36.7C )  (57 - 77 )  (16 - 28 )  (155 - 187 )/ (74 - 116 )  (115 - 127 )  (91% - 99% )      Pain reported at 9/26 9:03: 6 = Moderate    Physical Exam Narrative:  ·  Physical Exam:    General: awake, alert, conversant, appears stated age  HENT: NCAT, PERRL, MMM  Eyes: no scleral icterus or conjunctival pallor   Skin: no suspect lesions or rashes noted on visible skin  Cardiac: RRR, normal S1, S2, no M/R/G  Pulm: CTAB, normal respiratory effort, on room air  Abdomen: soft, ND, NT, no involuntary guarding or rebound tenderness  : no flank pain  or indwelling urinary catheter  EXT: 1+ pitting edema to mid-shin in both legs, no asymmetry noted  MSK: no focal joint  swelling noted, sensation and strength intact 5/5 in all URE, LLE, LRE; ULE strength 4/5   Neuro: AOx3, able to follow commands, no gross FND  Psych: coherent thought process, appropriate mood and affect    Medication:    Medications:          Continuous Medications       --------------------------------  No continuous medications are active       Scheduled Medications       --------------------------------    1. Albuterol 2.5 mg - Ipratropium 0.5 mg/ 3 mL Neb Soln:  3  mL  Inhalation  Every 4 Hours    2. Atorvastatin:  20  mg  Oral  Daily    3. Budesonide 0.5 mg/ 2 mL  Nebulizer Soln:  2  mL  Inhalation  Every 12 Hours    4. Celecoxib (CELEBREX):  200  mg  Oral  Daily    5. Docusate:  240  mg  Oral  Daily    6. Enoxaparin SubCutaneous:  40  mg  SubCutaneous  Every 24 Hours    7. hydroCHLOROthiazide:  25  mg  Oral  Daily    8. Insulin Lispro Mild Corrective Scale:  unit(s)  SubCutaneous  3 Times a Day Before Meals    9. Metoprolol Tartrate:  100  mg  Oral  2 Times a Day    10. predniSONE:  40  mg  Oral  Every 24 Hours    11. Pregabalin:  200  mg  Oral  Daily         PRN Medications       --------------------------------    1. Acetaminophen:  650  mg  Oral  Every 4 Hours    2. Albuterol 2.5 mg/ 3 mL Nebulizer Soln:  3  mL  Inhalation  Every 2 Hours    3. Calcium Carbonate Chewable:  1000  mg  Oral  Every 2 Hours    4. Dextrose 50% in Water Injectable:  25  gram(s)  IntraVenous Push  Every 15 Minutes    5. Glucagon Injectable:  1  mg  IntraMuscular  Every 15 Minutes    6. oxyCODONE Immediate Release:  5  mg  Oral  Every 4 Hours    7. Simethicone:  80  mg  Oral  4 Times a Day After Meals    8. Sodium Chloride 0.9% Injectable Flush:  10  mL  IntraVenous Flush  Every 8 Hours and as Needed    9. Sodium Chloride 0.9% Injectable Flush:  10  mL  IntraVenous Flush  Every 8 Hours and as Needed    10. Sore Throat Lozenge:  1  lozenge(s)  Oral  Every 2 Hours        Recent Lab Results:    Results:    CBC: 9/26/2023 07:07              \     Hgb     /                              \     11.0 L    /  WBC  ----------------  Plt               9.8       ----------------    339              /     Hct     \                              /     35.3 L    \            RBC: 4.05     MCV: 87           RFP: 9/26/2023 07:07  NA+        Cl-     BUN  /                         143    103    17  /  --------------------------------  Glucose                ---------------------------  95    K+     HCO3-   Creat \                         4.0    30    1.01  \  Calcium : 9.0Anion Gap : 14          Albumin : 3.9      Phos : 5.1 H        I have reviewed these laboratory results:    Glucose_POCT  26-Sep-2023 08:29:00      Result Value    Glucose-POCT  120   H     Magnesium, Serum  26-Sep-2023 07:07:00      Result Value    Magnesium, Serum  2.19        Radiology Results:    Results:        Impression:    1. No evidence of acute pulmonary embolism. Within the main pulmonary  artery or its branches to  proximal segmental level. Please note  that, assessment of distal segmental and subsegmental branches is  limited and small peripheral emboli are not entirely excluded. The  main pulmonary artery is prominent at 2.8 cm.  2. There is extensive intrathoracic lymphadenopathy,  includingmediastinal and bilateral perihilar lymphadenopathy.  Additionally, there is a large 3.6 x 3.4 cm x 4.0 cm soft tissue  density within the mediastinum. This likely represents a conglomerate  of right paratracheal nodes, however unable to exclude a soft tissue  mass. Lastly, there is bilateral pleural thickening,  left-greater-than-right. An underlying neoplastic  process/lymphoproliferative disorder is not excluded. Oncology  consult is recommended and PET-CT can be obtained as clinically  warranted.  3. Incidental note is made of a small amount of venous collaterals  within the anterior chest wall. There is no evidence of venous  thrombosis within the visualized venous structures, however this is  somewhat limited due to beam hardening artifact from contrast bolus  within the left subclavian vein.      TH CT Angio Chest for PE [Sep 24 2023  2:33PM]      Assessment and Plan:   Daily Risk Screen:  ·  Does patient have an indwelling urinary catheter? n/a consulting service   ·  Does patient have a central line? n/a consulting service     Comorbidities:  ·  Comorbidity obesity   ·  Obesity morbid obesity (BMI 40+)   ·  BMI 54.05     Code Status:  ·  Code Status Full Code     Assessment:    Marian Zhou is a 59 yo female with PMHx asthma, hypertension,  T2DM, obesity, and hyperlipidemia who is presenting for SOB and admitted  for SOB that has not subjectively improved with treatment and for workup of mediastinal mass. Differential for mediastinal mass includes malignancy vs reactive lymphadenopathy vs infectious process. Low concern for infectious etiology given stable vitals,  no leukocytosis, and no evidence of definite infection on imaging. Shortness of breath ongoing is reassured by stable vitals and benign pulmonary exam.     Updates 9/26:  -SBP remains elevated to 180s, asymptomatic  -increased HCTZ from 12.5 mg to 25 mg daily  -optimized pain regimen: c/w lidocaine patch, scheduled tylenol, and as needed oxy. Also resumed on home celecoxib 200 mg daily  -heme/onc consulted for mediastinal mass, appreciate rec's     #SOB   #Asthma  -  Patient received DuoNebs treatment, but reports that her symptoms return within a few hours  -  Patient started on steroids in the CDU  -  No increased work of breathing, wheezes, and is hemodynamically stable   -  9/24 CTPE: no acute PE, 9/25 b/l LE DVT US negative for DVT  -  SOB less likely 2/2 asthma exacerbation given lack of improvement with adequate treatment. Unlikely to be from PE given negative CT  and Duplex US findings, lack of O2 requirement. Suspect SOB could be  related to mediastinal mass  []    Continue DuoNebs, albuterol, and pulmicort nebulizers  []    Continue PO prenisone 40 mg daily x 5-7 days (9/24-*)    #Mediastinal Mass  -  TH CT Angio 9/24 ruled out PE, but showed a large 3.6 x 3.4 cm x 4.0 cm soft tissue density within  the mediastinum  -  Patient reporting night sweats that require her to change her sheets and interfere with her sleep, but no fevers, chills, or unintended weight loss  -  Heme/Onc consulted for further evaluation  []    Heme/Onc to staff patient, appreciate rec?s      #HTN  #L Shoulder pain   -  Patient BP at 205/97 on admission, reports not taking her medications  -  BP has remained  elevated overnight to SBP 180s  -  Patient attributes higher BP to 6/10 L shoulder pain she is experiencing  -  Patient reports improvement in L shoulder pain with lidocaine patch and celecoxib  -  Bilateral 1+ pitting edema in lower extremities  [] C/w home metoprolol tartrate  []     Increase HCTZ to 25mg oral daily  []     Start Celecoxib 200 mg oral daily and lidocaine 4% TransDermal patch iso L shoulder pain    #Diabetes  -  Patient has not been taking metformin per chart review  -  No HbA1c on record  []  HbA1c to be  drawn 9/26   [] Sliding scale insulin started     F: bolus PRN  E: replete K>4, Phos>3, Mg>2  N: Regular  A: PIV    DVT PPx: SCDs  GI PPx: N/A    CODE STATUS: Full Code (confirmed on admission)  SURROGATE DECISION MAKER: Christy Tang (oldest daughter) [phone]    Stella Talia, MS3  [facility] [employer]    I personally saw, examined, and discussed the patient above. I have reviewed and agree with the above medical student note. All edits and/or correction have  been made directly into the note, including the physical exam and assessment/plan. Patient was seen & discussed with Dr. Sin who agrees with the plan as outlined above .  Areli Boles MD  Internal Medicine PGY1      Attestation:   Note Completion:  I am a:  Medical Student/Acting Intern   Medical Student Attestation I, or a resident under my supervision, was present with the medical student who participated in the documentation of this note.  I have personally seen and examined the patient and performed the medical decision-making components. I have reviewed the medical student documentation and/or resident documentation and verified the findings in the note as written with additions or exceptions  as stated in the body of this note.    I personally evaluated the patient on 26-Sep-2023   Comments/ Additional Findings    Please review addendum from history and physical from 9/25          Electronic  Signatures:  Stella Melgar (MED STUD)  (Signed 26-Sep-2023 10:56)   Authored: Service, Subjective Data, Objective Data, Assessment  and Plan  Tanya Sin)  (Signed 26-Sep-2023 16:32)   Authored: Note Completion   Co-Signer: Assessment and Plan, Note Completion  Areli Boles (Resident))  (Signed 26-Sep-2023 12:43)   Authored: Assessment and Plan, Note Completion      Last Updated: 26-Sep-2023 16:32 by Tanya Sin)

## 2023-09-30 NOTE — PROGRESS NOTES
Service: General Internal Medicine     Subjective Data:   FELIPA ROY is a 58 year old Female who is Hospital Day # 4.     RASHEEDA. This AM at bedside, patient states that her SOB is slowly getting better. She feels the steroids have been helping her breathing. Anxious to find out what this mass is but otherwies denies f/c,  cough, chest pain, abd pain, n/v, diarrhea/constipation, dysuria.    Objective Data:     Objective Information:      T   P  R  BP   MAP  SpO2   Value  36.5  69  18  191/101   109  99%  Date/Time 9/27 8:36 9/27 8:36 9/27 8:36 9/27 8:36  9/27 0:08 9/27 8:36  Range  (36.4C - 37.1C )  (57 - 79 )  (18 - 22 )  (154 - 191 )/ (83 - 104 )  (109 - 127 )  (95% - 99% )  Highest temp of 37.1 C was recorded at 9/27 0:08      Pain reported at 9/27 10:32: 0 = None    Physical Exam Narrative:  ·  Physical Exam:    General: awake, alert, conversant, appears stated age  HENT: NCAT, PERRL, MMM  Eyes: no scleral icterus or conjunctival pallor   Skin: no suspect lesions or rashes noted on visible skin  Cardiac: RRR, normal S1, S2, no M/R/G  Pulm: CTAB, normal respiratory effort, on room air  Abdomen: soft, ND, NT, no involuntary guarding or rebound tenderness  : no flank pain  or indwelling urinary catheter  EXT: 1+ pitting edema to mid-shin in both legs, no asymmetry noted  MSK: no focal joint  swelling noted, sensation and strength intact 5/5 in all URE, LLE, LRE; ULE strength 4/5   Neuro: AOx3, able to follow commands, no gross FND  Psych: coherent thought process, appropriate mood and affect    Recent Lab Results:    Results:    CBC: 9/26/2023 07:07              \     Hgb     /                              \     11.0 L    /  WBC  ----------------  Plt               9.8       ----------------    339              /     Hct     \                              /     35.3 L    \            RBC: 4.05     MCV: 87           RFP: 9/26/2023 07:07  NA+        Cl-     BUN  /                         143    103     17  /  --------------------------------  Glucose                ---------------------------  95    K+     HCO3-   Creat \                         4.0    30    1.01  \  Calcium : 9.0Anion Gap : 14          Albumin : 3.9     Phos : 5.1 H        I have reviewed these laboratory results:    Glucose_POCT  26-Sep-2023 08:29:00      Result Value    Glucose-POCT  120   H     Magnesium, Serum  26-Sep-2023 07:07:00      Result Value    Magnesium, Serum  2.19        Radiology Results:    Results:        Impression:    No sonographic evidence for deep vein thrombosis within the evaluated  veins of the bilateral lower extremity.     MACRO:   US Venous Duplex Lower Extremity Veins Bilateral [Sep 25 2023  6:01AM]      Impression:     [No sonographic evidence for deep vein thrombosis within the evaluated veins of the [bilateral lower extremity].]    MACRO:  [ US Venous Duplex Lower Extremity Veins Bilateral [Sep 24 2023  9:56PM]      Impression:    1. No evidence of acute pulmonary embolism. Within the main pulmonary  artery or its branches to  proximal segmental level. Please note  that, assessment of distal segmental and subsegmental branches is  limited and small peripheral emboli are not entirely excluded. The  main pulmonary artery is prominent at 2.8 cm.  2. There is extensive intrathoracic lymphadenopathy,  includingmediastinal and bilateral perihilar lymphadenopathy.  Additionally, there is a large 3.6 x 3.4 cm x 4.0 cm soft tissue  density within the mediastinum. This likely represents a conglomerate  of right paratracheal nodes, however unable to exclude a soft tissue  mass. Lastly, there is bilateral pleural thickening,  left-greater-than-right. An underlying neoplastic  process/lymphoproliferative disorder is not excluded. Oncology  consult is recommended and PET-CT can be obtained as clinically  warranted.  3. Incidental note is made of a small amount of venous collaterals  within the anterior chest wall. There  is no evidence of venous  thrombosis within the visualized venous structures, however this is  somewhat limited due to beam hardening artifact from contrast bolus  within the left subclavian vein.      TH CT Angio Chest for PE [Sep 24 2023  2:33PM]      Assessment and Plan:   Daily Risk Screen:  ·  Does patient have an indwelling urinary catheter? n/a consulting service   ·  Does patient have a central line? n/a consulting service     Comorbidities:  ·  Comorbidity obesity   ·  Obesity morbid obesity (BMI 40+)   ·  BMI 54.05     Code Status:  ·  Code Status Full Code     Assessment:    Marian Zhou is a 59 yo female with PMHx asthma, hypertension, T2DM, obesity,  JAVI (not on CPAP), and hyperlipidemia who is presenting for SOB that has not subjectively improved with treatment and for workup of mediastinal mass. Differential for mediastinal mass includes  malignancy vs reactive lymphadenopathy vs infectious process. Low concern for infectious etiology given stable vitals, no leukocytosis, and no evidence of definite infection on imaging. Shortness of breath ongoing is reassured by stable vitals and benign  pulmonary exam. Plan for EBUS today to determine etiology of mass.     Updates 9/27:  -NPO for EBUS today   -pulm following, appreciate recs -- low suspicion for malignancy or infectious etiology, may be sarcoid   -Auto CPAP at night   -c/w 40 PO prednisone (9/24-9/28) and nebulizer regimen       #SOB   #Asthma  #JAVI (not on CPAP)  -  Patient received DuoNebs treatment, but reports that her symptoms return within a few hours  -  Patient started on steroids in the CDU  -  No increased work of breathing, wheezes, and is hemodynamically stable   -  9/24 CTPE: no acute PE, 9/25 b/l LE DVT US negative for DVT  -  SOB less likely 2/2 asthma exacerbation given lack of improvement with adequate treatment. Unlikely to be from PE given negative CT  and Duplex US findings, lack of O2 requirement. Suspect SOB could be   related to mediastinal mass  []    Continue DuoNebs, albuterol, and pulmicort nebulizers  []    Continue PO prenisone 40 mg daily x 5 days (9/24-9/28)  []    Ensure auto CPAP nightly    #Mediastinal Mass  -  TH CT Angio 9/24 ruled out PE, but showed a large 3.6 x 3.4 cm x 4.0 cm soft tissue density within  the mediastinum  -  Patient reporting night sweats that require her to change her sheets and interfere with her sleep, but no fevers, chills, or unintended weight loss  -  Heme/Onc consulted for further evaluation -- requested pulm involvement for biopsy  []    NPO for EBUS today  []    Pulm following, appreciate recs -- low suspicion for malignancy or infectious etiology, may be sarcoid    #HTN  #L Shoulder pain   -  Patient BP at 205/97 on admission, reports not taking her medications  -  BP has remained elevated overnight to SBP 180s  -  Patient attributes higher BP to 6/10 L shoulder pain she is experiencing  -  Patient reports improvement in L shoulder pain with lidocaine patch and celecoxib  -  Bilateral 1+ pitting edema in lower extremities  [] C/w home metoprolol tartrate , HCTZ 25 mg daily, celecoxib 200 mg daily, lidocaine patch to L shoulder    #Diabetes  -  Patient has not been taking metformin per chart review  -  A1c 7.1% (9/26)  -  Blood sugars 90s-140s, does not currently appear to need anything beyond SSI   []  C/w sliding scale insulin    F: bolus PRN  E: replete K>4, Phos>3, Mg>2  N: Regular  A: PIV    DVT PPx: SCDs  GI PPx: N/A    CODE STATUS: Full Code (confirmed on admission)  SURROGATE DECISION MAKER: Christy Tang (oldest daughter) [phone]    Areli Boles MD  Internal Medicine PGY1      Attestation:   Note Completion:  I am a:  Resident/Fellow   Attending Attestation I saw and evaluated the patient.  I personally obtained the key and critical portions of the history and physical exam or was physically present for key and  critical portions performed by the resident/fellow. I  reviewed the resident/fellow?s documentation and discussed the patient with the resident/fellow.  I agree with the resident/fellow?s medical decision making as documented in the note.     I personally evaluated the patient on 27-Sep-2023   Comments/ Additional Findings    Seen and examined on rounds this morning    No Acute events overnight. States SOB is better, however still exhibits dyspnea while walking.   Vitals this morning pressure prior to receiving antihypertensives 191/101 heart rate 69 satting 94% on room air.  Labs wnl     Constitutional: in NAD  HEENT: sclerae anicteric, EOM grossly intact  CV: RRR, no murmurs noted  Pulm: CTAB, no increased WOB  GI: abd soft, NT, ND  Skin: warm and dry    Assessment and medical decision making  Asthma exacerbation-continue DuoNebs every 4-6 hours as needed continue Pulmicort continue prednisone 40 mg (end date 9/28)  Mediastinal mass- Pulmonology consulted and following recommending 5-day course of steroids for asthmatic exacerbation.  Additionally plan for EBUS biopsy of mediastinal mass.  Hypertension from chart review and discussion with pharmacy who did outpatient medication review it appears that patient was only taking hydrochlorothiazide 25 mg daily. From pharmacy review last  time amlodipine 10 mg was refilled that was in 2022 however she had not been taking this.  And last time losartan 25 mg was refilled was June 2023 however on interview patient denied taking losartan.  Considering this it is likely that patient was in  need of  additional antihypertensives. Will plan to start losartan 25 mg, and schedule PCP follow up for titration of antihypertensives.     Dispo: home tomorrow              Electronic Signatures:  Tanya Sin)  (Signed 27-Sep-2023 15:43)   Authored: Note Completion   Co-Signer: Subjective Data, Assessment and Plan, Note Completion  Areli Boles (Resident))  (Signed 27-Sep-2023 12:18)   Authored: Service, Subjective  Data, Objective Data, Assessment  and Plan, Note Completion      Last Updated: 27-Sep-2023 15:43 by Tanya Sin)

## 2023-09-30 NOTE — DISCHARGE SUMMARY
Send Summary:   Discharge Summary Providers:   Provider Role Provider Name   · Referring Tanya Sin   · Attending Tanya Sin       Note Recipients: Pulmonary Consult, Pseudo  Tanya Sin MD EGHOBAMIEN, Edison Lazar MD Donald Eghobamien, MD - [id number]       Discharge:    Summary:   Admission Date: .24-Sep-2023 08:01:00   Discharge Date: 28-Sep-2023   Attending Physician at Discharge: Tanya Sin   Admission Reason: dyspnea x two weeks (1)   Final Discharge Diagnoses: SOB, mediastinal mass   Procedures: Bronchoscopy Sep 27, 2023   Condition at Discharge: Satisfactory   Disposition at Discharge: .Home   Vital Signs:        T   P  R  BP   MAP  SpO2   Value  36.6  74  20  166/104   119  95%  Date/Time 9/28 11:56 9/28 11:56 9/28 11:56 9/28 11:56  9/28 9:13 9/28 11:56  Range  (35.2C - 37.1C )  (63 - 83 )  (18 - 20 )  (146 - 194 )/ (70 - 114 )  (97 - 119 )  (93% - 100% )  Highest temp of 37.1 C was recorded at 9/27 0:08    Date:            Weight/Scale Type:  Height:   27-Sep-2023 14:44  138.2  kg         159.9  cm  Physical Exam:    Physical Exam Narrative:  ·  Physical Exam:    General: awake, alert, conversant, appears stated age  HENT: NCAT, PERRL, MMM  Eyes: no scleral icterus or conjunctival pallor   Skin: no suspect lesions or rashes noted on visible skin  Cardiac: RRR, normal S1, S2, no M/R/G  Pulm: CTAB, normal respiratory effort, on room air  Abdomen: soft, ND, NT, no involuntary guarding or rebound tenderness  : no flank pain  or indwelling urinary catheter  EXT: 1+ pitting edema to mid-shin in both legs, no asymmetry noted  MSK: no focal joint  swelling noted, sensation and strength intact 5/5 in all URE, LLE, LRE; ULE strength 4/5   Neuro: AOx3, able to follow commands, no gross FND  Psych: coherent thought process, appropriate mood and affect    Hospital Course:    Marian Zhou is a 59 yo female w/ PMHx asthma, hypertension, T2DM, obesity, and  hyperlipidemia presenting for a 1-2 week history of shortness of breath attributed  to an asthma exacerbation admitted for further evaluation of mediastinal mass found incidentally on CT.    Upon arrival to ED patient was afebrile and hypertensive to 205/97 but non-tachycardic and saturating 97% RA. CBC, RFP, LFT, troponin x2, and BNP unremarkable. D-dimer elevated to 1149. CTPE negative for PE but did demonstrate extensive intrathoracic  lymphadenopathy as well as 3.6 x 3.4 x 4.0 cm soft tissue density within the mediastinum. Patient received Duonebs, albuterol nebulizer, and 40 mg PO prednisone in the ED and was admitted to CDU for monitoring of asthma symptoms. Despite Duonebs, budesonide,  albuterol, and PO prednisone, patient's shortness of breath did not subjectively improve so she was ultimately admitted for further evaluation of mediastinal mass.     Upon my interview, the patient appears in no acute distress, able to speak in full sentences, and no increased WOB. She reports feeling short of breath despite her nebulizer and steroid treatments. The patient reports worsening shortness of breath over  the last 1-2 weeks that has not improved with Symbicort. The patient also endorses night sweats that require her to change her sheets and interfere with her sleep at night. She denies fevers, chills, unintended weight loss, and unintended weight gain.  She also reports that she has felt hoarseness in her voice, but this occurred after starting Duoneb treatments. The patient has also noticed facial swelling some time during the past week, but cannot recall when. Of note, she was eating more salty foods  and taking more alcohol this past week for birthday celebration.    Upon admission to the hospital, patient was continued on Duonebs, albuterol nebs, budesonide nebs, and PO prednisone. Her home antihypertensives (HCTZ, metoprolol tartrate) were also continued. Oncology was consulted for further workup of  mediastinal  mass. They recommended pulmonology consult for possible biopsy.     On 9/26, HCTZ dose was increased to 25 mg daily for asymptomatic SBP 180s. Pulmonology saw the patient who recommended EBUS. Felt the mediastinal mass less likely infectious or malignant in etiology, could possibly be sarcoidosis. Recommended 5 day course  of PO prednisone (9/24-9/28).     On 9/27, patient underwent EBUS. Patient tolerated the procedure well with no immediate complications. Rapid On-Site Evaluation preliminary report suggestive of granulomatous tissue in node level 4R, 7, and 11Rs with final report pending. No atypical  lymphocytes seen but there was granulomatous inflammation noted. BAL studies showing WBC 58 (71% mononuclear cells, 25% lymphocytes, 3% neutrophils, 1% eosinophils). Per pulmonology, highest suspicion for granulomatous lymphadenitis. Losartan 25mg daily  was added to hypertension medication regimen due to asymptomatic SBP 190s.     On 9/28, patient was clinically stable and patient was discharged home with outpatient follow up with PCP and Pulmonology.     Medication changes on discharge:  - Medications to start:  Metformin 500 mg  Duonebs twice a day (nebulizer)  Albuterol 2 puffs every 6 hours (inhaler)  Symbicort 2 puff twice a day (inhaler)    Medications to continue:  Celebrex 200 mg daily  Losartan 25 mg daily  Pregabalin 200 mg daily  Atorvastatin 20 mg daily  metoprolol 100 mg twice a day  Hydochlorothiazide (HCTZ) 25 mg daily    - Medications to stop:   Prednisone 40 mg daily (completed 5 day course 9/24-9/28)    Appointments on discharge:  - Primary care physician: Dr. Edison Hernandez at [address] [place] [address], [place] [address]. Please call and schedule your appointment at [phone].  - Pulmonology: they will call you to set up an appointment       Discharge Information:    and Continuing Care:   Lab Results - Pending:    Culture, Miscellaneous, includes Gram Stain Drawn at  28-Sep-2023 03:19:00  Culture, Fungus + Fungus Stain Drawn at 27-Sep-2023 00:01:00  Culture, Fungus + Fungus Stain Drawn at 27-Sep-2023 00:01:00  Culture, Acid Fast Bacilli, Misc.+ AFB Stain Drawn at 27-Sep-2023 00:01:00  Cytology-Non GYN Drawn at 27-Sep-2023 00:00:00  Radiology Results - Pending: None   Discharge Instructions:    Activity:           activity with assistance.    Nutrition/Diet:           regular    Additional Orders:           Additional Instructions:   Dear MsDeandra Zhou,    You were admitted to [facility] after having shortness of breath for a few weeks. You let us know that the Symbicort that you use at home was not helping your shortness of breath like it usually does. We know you have a history of asthma, so we gave  you Duoneb treatments, an albuterol nebulizer, and steroids to help with your asthma symptoms. You let us know that these medications helped for a few hours, but your shortness of breath returned soon after. We also wanted to make sure that you did not  have a blood clot in your lungs, so we took a CT scan of your chest. Although we did not see a blood clot in your lungs on this scan, we saw something that resembled a mass in your chest that we hadn't seen previously. This finding, combined with the  fact that your shortness of breath didn't improve with the asthma treatment, was the reason you were admitted to the hospital.     We wanted to get to the bottom of this new CT finding, so we talked to our colleagues in Oncology as well as Pulmonology. Pulmonology recommended that we take a biopsy of this mass through a bronchoscopy, where they go through your airway to reach the  mass. Initial findings showed that there was some inflammation, but the final biopsy results will take a few weeks to come back. Our friends in pulmonology will follow-up with you outpatient, as listed below. In the meantime, we encourage you to continue  using your asthma medications as well as your CPAP  machine at night. Your CPAP machine will help you breathe better and have better sleep at night!    When you came to the ED, we also noticed that your blood pressure was high at 205/97. You let us know that you hadn't been able to refill some of your blood pressure medications due [facility]. We restarted your home medications, but  noticed that your blood pressure continued to be high. We tweaked some of your medications and saw that your blood pressure started to come down. Your new medication regimen is listed below, and we highly encourage you to stick to it in order to control  your blood pressures.    Although you will follow-up outpatient for this, we started you on insulin as needed for high blood sugars;  we saw that your HbA1c level (the number that tells us how well your diabetes is controlled) were also elevated. Please follow-up outpatient with  Dr. Hernandez, your PCP, to discuss your blood pressure and diabetes medications further.     Medication changes on discharge:  - Medications to start:  · Metformin 500 mg  · Duonebs twice a day (nebulizer)  · Albuterol 2 puffs every 6 hours (inhaler)  · Symbicort 2 puff twice a day (inhaler)    Medications to continue:  · Celebrex 200 mg daily  · Losartan 25 mg daily  · Pregabalin 200 mg daily  · Atorvastatin 20 mg daily  · metoprolol 100 mg twice a day  · Hydochlorothiazide (HCTZ) 25 mg daily    - Medications to stop:   · Prednisone 40 mg daily (completed 5 day course 9/24-9/28)    Appointments on discharge:  - Primary care physician: Dr. Edison Hernandez at [facility] [address], [place] [address]. Please call and schedule your appointment at [phone].  - Pulmonology: they will call you to set up an appointment     It was a pleasure taking care of you! Please feel free to reach out at any time with any questions or concerns.     All the best,  [facility]                Infectious Disease:           PPD  Status:   not given          Isolation Type:   none    Follow Up Appointments:    Follow-Up Appointment 01:           Physician/Dept/Service:   Dr. Edison Hernandez, PCP          Location:   [address] [place] [address]          Phone Number:   [phone]    Follow-Up Appointment 02:           Physician/Dept/Service:   Pulmonary          Phone Number:   [phone]    Discharge Medications: Home Medication   CeleBREX 200 mg oral capsule - 1 cap(s) orally once a day  losartan 25 mg oral tablet - 1 tab(s) orally once a day  docusate calcium 240 mg oral capsule - 1 cap(s) orally once a day  ipratropium-albuterol 0.5 mg-2.5 mg/3 mL inhalation solution - 3 milliliter(s) inhaled 2 times a day   hydroCHLOROthiazide 25 mg oral tablet - 1 tab(s) orally once a day  lidocaine 4% topical film - Apply topically to affected area (shoulder) once a day - to wear for 12 hours daily and have off for 12 hours daily   metoprolol tartrate 100 mg oral tablet - 1 tab(s) orally 2 times a day  atorvastatin 20 mg oral tablet - 1 tab(s) orally once a day  metFORMIN 500 mg oral tablet - 1 tab(s) orally once a day  pregabalin 200 mg oral capsule - 1 cap(s) orally once a day  Symbicort 160 mcg-4.5 mcg/inh inhalation aerosol - 2 puff(s) inhaled 2 times a day      PRN Medication   albuterol 90 mcg/inh inhalation aerosol - 2 puff(s) inhaled every 6 hours, As Needed - for shortness of breath     DNR Status:   ·  Code Status Code Status order at time of discharge: Full Code     Attestation:   Note Completion:  I am a:  Resident/Fellow   Attending Attestation I saw and evaluated the patient.  I personally obtained the key and critical portions of the history and physical exam or was physically present for key and  critical portions performed by the resident/fellow. I reviewed the resident/fellow?s documentation and discussed the patient with the resident/fellow.  I agree with the resident/fellow?s medical decision making as  "documented in the note.     I personally evaluated the patient on 28-Sep-2023         Electronic Signatures:  Stella Melgar (MED STUD)  (Signed 28-Sep-2023 12:39)   Authored: Send Summary, Summary Content, Ongoing Care,  DNR Status  Tanya Sin)  (Signed 29-Sep-2023 12:13)   Authored: Send Summary, Ongoing Care, Note Completion   Co-Signer: Summary Content, Note Completion  Areli Boles (Resident))  (Signed 28-Sep-2023 19:26)   Authored: Send Summary, Summary Content, Ongoing Care,  Note Completion      Last Updated: 29-Sep-2023 12:13 by Tanya Sin)    References:  1.  Data Referenced From \"History and Physical\" 24-Sep-2023 16:00   "

## 2023-09-30 NOTE — H&P
"    History of Present Illness:   Pregnant/Lactating:  ·  Are You Pregnant no (1)   ·  Are You Currently Breastfeeding no (1)     Admission Reason: dyspnea x two weeks   HPI:    FELIPA ROY is a 58 year old Female with past medical history significant for asthma, HTN, type two diabetes (was prescribed Metformin but does not take it),  obesity and hyperlipidemia presented to the ED with two week history of dyspnea. Denies any cough, fever, chills, wheezing , no recent travel and no known sick contact.   In the ED, her ECG was non-ischemic, two negative troponin, H&H of 10.6&32.0, BNP of 90, Magnesium of 1.97 and D-dimer of 1149. Chest x-ray showed no abnormalities and her chest CT showed no PE but the following,     \"1. There is extensive intrathoracic lymphadenopathy, including mediastinal and bilateral perihilar lymphadenopathy. Additionally, there is a large 3.6 x 3.4 cm x 4.0 cm soft tissue density within the mediastinum. This likely represents a conglomerate  of right paratracheal nodes, however unable to exclude a soft tissue  mass. Lastly, there is bilateral pleural thickening, left-greater-than-right. An underlying neoplastic process/lymphoproliferative disorder is not excluded. Oncology consult is recommended and PET-CT can be obtained as clinically warranted.  2. Incidental note is made of a small amount of venous collaterals within the anterior chest wall. There is no evidence of venous thrombosis within the visualized venous structures, however this is somewhat limited due to beam hardening artifact from  contrast bolus within the left subclavian vein.\"    The above has been fully discussed with the patient in the ED. Per the ED resident, he was going to e-mail the mass clinic for a close follow up.     Patient was given Prednisone, aerosol treatments with no significant improvement. She was admitted to the CDU for further symptom management. The history was obtained from the patient with no " limitations.     Symptom  1: dyspnea   Symptom Duration: week(s)   Context: mediastinal mass/ asthma     Comorbidities:   Comorbidites:  ·  Comorbid Conditions diabetes, hypertension   ·  Diabetes Type Type 2   ·  Insulin Dependent no   ·  DM Acuity or Status unknown     Past Medical/Surgical History:        Medical History:   Status post dilation and curettage:     Family History:   Family History: reviewed and not pertinent to presenting  problem     Social History:   Social History:  Smoking Status never smoker   Alcohol Use occasionally   Drug Use denies              Allergies:  ·  penicillin : Hives/Urticaria    Medications Prior to Admission:   Admission Medication Reconciliation has not been completed for this patient.    Objective:     Objective Information:      T   P  R  BP   MAP  SpO2   Value  36.2  66  16  172/101   122  93%  Date/Time 9/24 8:04 9/24 14:00 9/24 13:00 9/24 14:00  9/24 14:00 9/24 14:00  Range  (36.2C - 36.2C )  (63 - 90 )  (16 - 18 )  (172 - 207 )/ (96 - 123 )  (122 - 122 )  (93% - 98% )      Pain reported at 9/24 15:35: 0 = None    Physical Exam by System:    Constitutional: Well developed, awake/alert/oriented  x3, tearful, alert and cooperative   Eyes: PERRL, EOMI, clear sclera   ENMT: mucous membranes moist, no apparent injury,  no lesions seen   Head/Neck: Neck supple, no apparent injury, thyroid  without mass or tenderness, No JVD, trachea midline, no bruits   Respiratory/Thorax: Patent airways, CTAB, normal  breath sounds with good chest expansion, thorax symmetric   Cardiovascular: Regular, rate and rhythm, no murmurs,  2+ equal pulses of the extremities, normal S 1and S 2   Gastrointestinal: Nondistended, soft, non-tender,  no rebound tenderness or guarding, no masses palpable, no organomegaly, +BS, no bruits   Genitourinary: Deferred   Musculoskeletal: ROM intact, no joint swelling, normal  strength   Extremities: normal extremities, no cyanosis edema,  contusions or wounds, no  clubbing   Neurological: alert and oriented x3, intact senses,  motor, response and reflexes, normal strength   Psychological: Appropriate mood and behavior  tearful   Skin: Warm and dry, no lesions, no rashes     Medications:    Medications:          Continuous Medications       --------------------------------  No continuous medications are active       Scheduled Medications       --------------------------------    1. Albuterol 2.5 mg - Ipratropium 0.5 mg/ 3 mL Neb Soln:  3  mL  Inhalation  Every 4 Hours    2. Atorvastatin:  20  mg  Oral  Daily    3. Budesonide 0.5 mg/ 2 mL Nebulizer Soln:  2  mL  Inhalation  Every 12 Hours    4. Metoprolol Tartrate:  100  mg  Oral  2 Times a Day         PRN Medications       --------------------------------    1. Acetaminophen:  650  mg  Oral  Every 4 Hours    2. Albuterol 2.5 mg/ 3 mL Nebulizer Soln:  3  mL  Inhalation  Every 2 Hours    3. Sodium Chloride 0.9% Injectable Flush:  10  mL  IntraVenous Flush  Every 8 Hours and as Needed        Recent Lab Results:    Results:        I have reviewed these laboratory results:    Troponin I, High Sensitivity  Trending View      Result 24-Sep-2023 14:25:00  24-Sep-2023 11:02:00    Troponin I, High Sensitivity 4   6        Coagulation Screen  24-Sep-2023 11:19:00      Result Value    Prothrombin Time, Plasma  11.9    International Normalized Ratio, Plasma  1.1    Activated Partial Thromboplastin Time  24   L     Complete Blood Count + Differential  24-Sep-2023 11:02:00      Result Value    White Blood Cell Count  7.2    Nucleated Erythrocyte Count  0.0    Red Blood Cell Count  3.97   L   HGB  10.6   L   HCT  32.0   L   MCV  81    MCHC  33.1    PLT  299    RDW-CV  14.6   H   Neutrophil %  54.3    Immature Granulocytes %  0.4    Lymphocyte %  32.1    Monocyte %  8.6    Eosinophil %  4.3    Basophil %  0.3    Neutrophil Count  3.93    Lymphocyte Count  2.32    Monocyte Count  0.62    Eosinophil Count  0.31    Basophil Count  0.02       Comprehensive Metabolic Panel  24-Sep-2023 11:02:00      Result Value    Glucose, Serum  106   H   NA  144    K  3.7    CL  110   H   Bicarbonate, Serum  23    Anion Gap, Serum  15    BUN  12    CREAT  0.79    GFR Female  88    Calcium, Serum  8.6    ALB  3.6    ALKP  62    T Pro  6.7    T Bili  0.5    Alanine Aminotransferase, Serum  22    Aspartate Transaminase, Serum  21      PT + INR, Plasma  24-Sep-2023 11:02:00      Result Value    Prothrombin Time, Plasma  11.7    International Normalized Ratio, Plasma  1.0      Activated Partial Thromboplastin Time  24-Sep-2023 11:02:00      Result Value    Activated Partial Thromboplastin Time  31      Brain Natriuretic Peptide  24-Sep-2023 11:02:00      Result Value    Brain Natriuretic Peptide  90      Magnesium, Serum  24-Sep-2023 11:02:00      Result Value    Magnesium, Serum  1.97      D-Dimer, Non VTE  24-Sep-2023 11:02:00      Result Value    D-Dimer, Non VTE  1149   A     Venous Full Panel  24-Sep-2023 10:54:00      Result Value    pH, Venous  7.36    pCO2, Venous  48    pO2, Venous  44    SO2, Venous  70    Bicarbonate, Calculated, Venous  27.1   H   HGB, Venous  11.3   L   Anion Gap Level, Venous  10    Base Excess, Venous  1.1    Calcium, Ionized Venous  1.17    Chloride Level  108   H   Glucose Level, Venous  114   H   HCT CALCULATED, Venous  34.0   L   Lactate Level, Venous  1.3    OXY HGB, Venous  68.1    Patient Temperature, Venous  37.0    Potassium Level, Venous  3.8    Sodium Level, Venous  141      Coronavirus 2019 by PCR  24-Sep-2023 09:09:00      Result Value    Fluid Source  Nasal, Nasopharyngeal    Coronavirus 2019,PCR  NOT DETECTED  Reference Range: Not Detected .This test has received FDA Emergency Use Authorization (EUA) and has been verified by [facility] ([facility]). This test is only authorized for the duration of time gary      Influenza A + B, PCR  24-Sep-2023 09:09:00      Result Value    Influenza A PCR   NOT DETECTED  Reference Range: Not Detected Respiratory virus testing is performed routinely by PCR  for Influenza A/B and RSV.  Not Detected results do not  preclude Influenza A/B or RSV infections since the adequacy of sample collection or lo    Influenza B PCR  NOT DETECTED  Reference Range: Not Detected Respiratory virus testing is performed routinely by PCR  for Influenza A/B and RSV.  Not Detected results do not  preclude Influenza A/B or RSV infections since the adequacy of sample collection or lo    Fluid Source  Nasal, Nasopharyngeal        Radiology Results:    Results:        Impression:    1. No evidence of acute pulmonary embolism. Within the main pulmonary  artery or its branches to  proximal segmental level. Please note  that, assessment of distal segmental and subsegmental branches is  limited and small peripheral emboli are not entirely excluded. The  main pulmonary artery is prominent at 2.8 cm.  2. There is extensive intrathoracic lymphadenopathy,  includingmediastinal and bilateral perihilar lymphadenopathy.  Additionally, there is a large 3.6 x 3.4 cm x 4.0 cm soft tissue  density within the mediastinum. This likely represents a conglomerate  of right paratracheal nodes, however unable to exclude a soft tissue  mass. Lastly, there is bilateral pleural thickening,  left-greater-than-right. An underlying neoplastic  process/lymphoproliferative disorder is not excluded. Oncology  consult is recommended and PET-CT can be obtained as clinically  warranted.  3. Incidental note is made of a small amount of venous collaterals  within the anterior chest wall. There is no evidence of venous  thrombosis within the visualized venous structures, however this is  somewhat limited due to beam hardening artifact from contrast bolus  within the left subclavian vein.      TH CT Angio Chest for PE [Sep 24 2023  2:33PM]      Impression:    No acute cardiopulmonary process.     Similar elevation of the right  "hemidiaphragm compared to prior  radiographs.     Xray Chest 2 View PA + Lateral [Sep 24 2023  9:31AM]      Assessment and Plan:   Problem List:       Additional Dx:   Endometrial polyp:    Abnormal uterine bleeding:        Medical History:   Status post dilation and curettage:     Assessment:    58 year old female with dyspnea,       Impression 1: dyspnea   Plan for Impression 1: - will continue with aerosol  treatments & prednisone   - reassess tomorrow morning       Electronic Signatures:  Anaya Taylor (MATI)  (Signed 24-Sep-2023 16:21)   Authored: History of Present Illness, Comorbidities,  Past Medical/Surgical History, Family History, Social History, Allergies, Medications Prior to Admission, Objective, Assessment and Plan, Note Completion      Last Updated: 24-Sep-2023 16:21 by Anaya Taylor (MATI)    References:  1.  Data Referenced From \"Triage - ED\" 24-Sep-2023 08:04   "

## 2023-09-30 NOTE — PROGRESS NOTES
"      Service: Emergency Medicine     Subjective Data:   FELIPA ROY is a 58 year old Female who is Hospital Day # 2.    Overnight Events: Patient had an uneventful night.   Additional Information:    Subjective  38-year-old female with past medical history of asthma hypertension type 2 diabetes obesity and hyperlipidemia with presented with a 1 to 2-week history of dyspnea, she endorsed  feeling increasingly short of breath with laying down or exerting herself, she has a history of asthma and initially thought she was wheezing but her albuterol inhalers and Symbicort were not helping her.  She was seen in the ED where she had a nonischemic  EKG, 2 negative troponins, normal H&H, proBNP of 90, magnesium 1 of 1.97 and a D-dimer of 1149, PE study was negative for PE however showed    \"1. There is extensive intrathoracic lymphadenopathy, including mediastinal and bilateral perihilar lymphadenopathy. Additionally, there is a large 3.6 x 3.4 cm x 4.0 cm soft tissue density within the mediastinum. This likely represents a conglomerate  of right paratracheal nodes, however unable to exclude a soft tissue  mass. Lastly, there is bilateral pleural thickening, left-greater-than-right. An underlying neoplastic process/lymphoproliferative disorder is not excluded. Oncology consult is recommended and PET-CT can be obtained as clinically warranted.  2. Incidental note is made of a small amount of venous collaterals within the anterior chest wall. There is no evidence of venous thrombosis within the visualized venous structures, however this is somewhat limited due to beam hardening artifact from  contrast bolus within the left subclavian vein.\"    The patient's states that despite steroids and breathing treatments she is not feeling much better, she was admitted here for DuoNebs and steroids however there may be a component of the mediastinal mass contributing to her shortness of breath if she  does not improve with therapy " in the CDU, may require full admission and possible oncology evaluation.        Patient has been admitted to the CDU for 10 hours. Serial assessments of patient clinical progress include:  1) No improvement with nebulizers and steroids as of evening time  2) Ongoing dyspnea, but no worsening  3) BLE edema noted on exam, pt states is new, with elevated dimer, ordered BLE US - negative for DVT      Objective  VS reviewed  Physical Exam:    Appearance: Alert, oriented, cooperative, Tearful when discussing mass, endorses being anxious and worried about this. Well nourished & well hydrated.    Skin: Warm, intact and dry. No lesions, rash, petechiae or purpura.     Eyes: PERRLA, EOMs intact.       ENT: Hearing grossly intact. No drooling, dysphagia or trismus. Voice non-muffled.    Neck: Supple, without meningismus.     Pulmonary: Clear bilaterally with good chest wall excursion. No rales, rhonchi or wheezing. No accessory muscle use or stridor.     Cardiac: Normal S1, S2 without murmur, rub, gallop or extrasystole. No reproducible chest tenderness.    Abdomen: Soft, nontender, active bowel sounds.     Genitourinary: Exam deferred.    Musculoskeletal: Spontaneously moving all extremities without limitation. Bilateral lower extremity edema R>L.    Neurological:  Ambulating without assistance with steady gait, non-ataxic.    Psychiatric: Appropriate mood and affect. Kempt appearance.        Assessment / Plan  Marian Zhou continues to be managed in accordance with the CDU clinical guidelines for dyspnea/asthma exacerbation/mediastinal mass. An update of their clinical problem list included:   1) Dyspnea  --No improvement with nebs/steroids as of evening assessment on 9/24  - No wheezing on exam despite ongoing dyspnea  - VSS  2) Mediastinal mass  -- Question of this being main contributor of her dyspnea, if no improvement overnight with treatments, may require full admission and further workup      Annabelle Garcia,  BYRON  [facility]  Adult Emergency Medicine Physician Assistant  [id number]      Objective Data:     Objective Information:      T   P  R  BP   MAP  SpO2   Value  36.5  76  20  186/102   133  94%  Date/Time 9/24 20:00 9/25 0:00 9/25 0:00 9/25 0:00  9/25 0:00 9/25 0:00  Range  (36.2C - 36.6C )  (60 - 90 )  (16 - 22 )  (172 - 207 )/ (96 - 123 )  (122 - 133 )  (93% - 98% )      Pain reported at 9/24 23:00: 8 = Severe      T   P  R  BP   MAP  SpO2   Value  36.5  76  20  186/102   133  94%  Date/Time 9/24 20:00 9/25 0:00 9/25 0:00 9/25 0:00  9/25 0:00 9/25 0:00  Range  (36.2C - 36.6C )  (60 - 90 )  (16 - 22 )  (172 - 207 )/ (96 - 123 )  (122 - 133 )  (93% - 98% )    Medication:    Medications:          Continuous Medications       --------------------------------  No continuous medications are active       Scheduled Medications       --------------------------------    1. Albuterol 2.5 mg - Ipratropium 0.5 mg/ 3 mL Neb Soln:  3  mL  Inhalation  Every 4 Hours    2. Atorvastatin:  20  mg  Oral  Daily    3. Budesonide 0.5 mg/ 2 mL Nebulizer Soln:  2  mL  Inhalation  Every 12 Hours    4. Docusate:  240  mg  Oral  Daily    5. hydroCHLOROthiazide:  12.5  mg  Oral  Daily    6. Metoprolol Tartrate:  100  mg  Oral  2 Times a Day    7. predniSONE:  40  mg  Oral  Every 24 Hours         PRN Medications       --------------------------------    1. Acetaminophen:  650  mg  Oral  Every 4 Hours    2. Albuterol 2.5 mg/ 3 mL Nebulizer Soln:  3  mL  Inhalation  Every 2 Hours    3. oxyCODONE Immediate Release:  5  mg  Oral  Every 4 Hours    4. Sodium Chloride 0.9% Injectable Flush:  10  mL  IntraVenous Flush  Every 8 Hours and as Needed        Recent Lab Results:    Results:    CBC: 9/24/2023 11:02              \     Hgb     /                              \     10.6 L    /  WBC  ----------------  Plt               7.2       ----------------    299              /     Hct     \                              /      32.0 L    \            RBC: 3.97 L    MCV: 81     Neutrophil %: 54.3      CMP: 9/24/2023 11:02  NA+        Cl-     BUN  /                         144    110 H   12  /  --------------------------------  Glucose                ---------------------------  106 H    K+     HCO3-   Creat \                         3.7    23    0.79  \           \  T Bili  /                    \  0.5  /  AST  x ---- x ALT        21 x ---- x 22         /  Alk P   \               /  62  \  Calcium : 8.6     Anion Gap : 15     Albumin : 3.6     T Protein : 6.7           Coagulation: 9/24/2023 11:19  PT  /                    11.9  /  -------<    INR          ----------<      1.1  PTT\                    24 L \                         I have reviewed these laboratory results:    Troponin I, High Sensitivity  Trending View      Result 24-Sep-2023 14:25:00  24-Sep-2023 11:02:00    Troponin I, High Sensitivity 4   6        Coagulation Screen  24-Sep-2023 11:19:00      Result Value    Prothrombin Time, Plasma  11.9    International Normalized Ratio, Plasma  1.1    Activated Partial Thromboplastin Time  24   L     Complete Blood Count + Differential  24-Sep-2023 11:02:00      Result Value    White Blood Cell Count  7.2    Nucleated Erythrocyte Count  0.0    Red Blood Cell Count  3.97   L   HGB  10.6   L   HCT  32.0   L   MCV  81    MCHC  33.1    PLT  299    RDW-CV  14.6   H   Neutrophil %  54.3    Immature Granulocytes %  0.4    Lymphocyte %  32.1    Monocyte %  8.6    Eosinophil %  4.3    Basophil %  0.3    Neutrophil Count  3.93    Lymphocyte Count  2.32    Monocyte Count  0.62    Eosinophil Count  0.31    Basophil Count  0.02      Comprehensive Metabolic Panel  24-Sep-2023 11:02:00      Result Value    Glucose, Serum  106   H   NA  144    K  3.7    CL  110   H   Bicarbonate, Serum  23    Anion Gap, Serum  15    BUN  12    CREAT  0.79    GFR Female  88    Calcium, Serum  8.6    ALB  3.6    ALKP  62    T Pro  6.7    T Bili  0.5    Alanine  Aminotransferase, Serum  22    Aspartate Transaminase, Serum  21      PT + INR, Plasma  24-Sep-2023 11:02:00      Result Value    Prothrombin Time, Plasma  11.7    International Normalized Ratio, Plasma  1.0      Activated Partial Thromboplastin Time  24-Sep-2023 11:02:00      Result Value    Activated Partial Thromboplastin Time  31      Brain Natriuretic Peptide  24-Sep-2023 11:02:00      Result Value    Brain Natriuretic Peptide  90      Magnesium, Serum  24-Sep-2023 11:02:00      Result Value    Magnesium, Serum  1.97      D-Dimer, Non VTE  24-Sep-2023 11:02:00      Result Value    D-Dimer, Non VTE  1149   A     Venous Full Panel  24-Sep-2023 10:54:00      Result Value    pH, Venous  7.36    pCO2, Venous  48    pO2, Venous  44    SO2, Venous  70    Bicarbonate, Calculated, Venous  27.1   H   HGB, Venous  11.3   L   Anion Gap Level, Venous  10    Base Excess, Venous  1.1    Calcium, Ionized Venous  1.17    Chloride Level  108   H   Glucose Level, Venous  114   H   HCT CALCULATED, Venous  34.0   L   Lactate Level, Venous  1.3    OXY HGB, Venous  68.1    Patient Temperature, Venous  37.0    Potassium Level, Venous  3.8    Sodium Level, Venous  141      Coronavirus 2019 by PCR  24-Sep-2023 09:09:00      Result Value    Fluid Source  Nasal, Nasopharyngeal    Coronavirus 2019,PCR  NOT DETECTED    Reference Range: Not Detected .  This test has received FDA Emergency Use Authorization (EUA) and has been   verified by [facility] ([facility]). This test   is only authorized for the duration of time gary      Influenza A + B, PCR  24-Sep-2023 09:09:00      Result Value    Influenza A PCR  NOT DETECTED    Reference Range: Not Detected Respiratory virus testing is performed routinely by PCR    for Influenza A/B and RSV.    Not Detected results do not preclude Influenza A/B or RSV   infections since the adequacy of sample collection or lo    Influenza B PCR  NOT DETECTED    Reference Range: Not  Detected Respiratory virus testing is performed routinely by PCR    for Influenza A/B and RSV.    Not Detected results do not preclude Influenza A/B or RSV   infections since the adequacy of sample collection or lo    Fluid Source  Nasal, Nasopharyngeal        Radiology Results:    Results:    No Results have been selected.  Please select Results from the Available Results list before marking as Reviewed.      Impression:     [No sonographic evidence for deep vein thrombosis within the evaluated veins of the [bilateral lower extremity].]    MACRO:  [ US Venous Duplex Lower Extremity Veins Bilateral [Sep 24 2023  9:56PM]      Impression:    1. No evidence of acute pulmonary embolism. Within the main pulmonary  artery or its branches to  proximal segmental level. Please note  that, assessment of distal segmental and subsegmental branches is  limited and small peripheral emboli are not entirely excluded. The  main pulmonary artery is prominent at 2.8 cm.  2. There is extensive intrathoracic lymphadenopathy,  includingmediastinal and bilateral perihilar lymphadenopathy.  Additionally, there is a large 3.6 x 3.4 cm x 4.0 cm soft tissue  density within the mediastinum. This likely represents a conglomerate  of right paratracheal nodes, however unable to exclude a soft tissue  mass. Lastly, there is bilateral pleural thickening,  left-greater-than-right. An underlying neoplastic  process/lymphoproliferative disorder is not excluded. Oncology  consult is recommended and PET-CT can be obtained as clinically  warranted.  3. Incidental note is made of a small amount of venous collaterals  within the anterior chest wall. There is no evidence of venous  thrombosis within the visualized venous structures, however this is  somewhat limited due to beam hardening artifact from contrast bolus  within the left subclavian vein.      TH CT Angio Chest for PE [Sep 24 2023  2:33PM]      Impression:    No evidence of acute pulmonary  embolism.] Within the main pulmonary artery or its branches to  proximal segmental level. Please note that, assessment of distal segmental and subsegmental branches is limited and small peripheral emboli are not entirely  excluded.] The main pulmonary artery is prominent at 2.8 cm.     There is extensive intrathoracic lymphadenopathy,including[mediastinal and bilateral perihilar lymphadenopathy. Additionally, there is a large 3.6 x 3.4 cm x 4.0 cm soft tissue density within the mediastinum. This likely represents a conglomerate of right  paratracheal nodes, however unable to exclude a soft tissue mass. Lastly, there is bilateral pleural thickening, left-greater-than-right. An underlying neoplastic process/lymphoproliferative disorder is not excluded. Oncology consult is recommended and  PET-CT can be obtained as clinically warranted.    Incidental note is made of a small amount of venous collaterals within the anterior chest wall. There is no evidence of venous thrombosis within the visualized venous structures, however this is somewhat limited due to beam hardening artifact from contrast  bolus within the left subclavian vein.           UNSIGNED REPOR TH CT Angio Chest for PE [Sep 24 2023  2:17PM]      Impression:    No acute cardiopulmonary process.     Similar elevation of the right hemidiaphragm compared to prior  radiographs.     Xray Chest 2 View PA + Lateral [Sep 24 2023  9:31AM]      Impression:  Xray Chest 2 View PA + Lateral [Sep 24 2023  8:31AM]      Assessment and Plan:   Code Status:  ·  Code Status Full Code       Electronic Signatures:  Annabelle Garcia (PAC)  (Signed 25-Sep-2023 02:22)   Authored: Service, Assessment/Plan Review, Subjective  Data, Objective Data, Assessment and Plan, Note Completion      Last Updated: 25-Sep-2023 02:22 by Annabelle Garcia (PAC)

## 2023-09-30 NOTE — PROGRESS NOTES
Service: Pulmonology     Subjective Data:   FELIPA ROY is a 58 year old Female who is Hospital Day # 5.     Patient states that she feels well again today. Didn't tolerate CPAP overnight, took it off at ~3 AM. Not currently short of breath, no wheezing. Last day of steroids today.    Objective Data:     Objective Information:      T   P  R  BP   MAP  SpO2   Value  36.6  74  20  166/104   119  95%  Date/Time 9/28 11:56 9/28 11:56 9/28 11:56 9/28 11:56  9/28 9:13 9/28 11:56  Range  (35.2C - 37.1C )  (63 - 83 )  (18 - 20 )  (146 - 194 )/ (70 - 114 )  (97 - 119 )  (93% - 100% )  Highest temp of 37.1 C was recorded at 9/27 0:08      Pain reported at 9/28 3:49: 7 = Severe    Physical Exam by System:    Constitutional: Well developed, awake/alert/oriented  x3, no distress, alert and cooperative. No dyspnea w/ conversation.   Eyes: PERRL, EOMI, clear sclera   Respiratory/Thorax: Patent airways, CTAB, normal  breath sounds with good chest expansion, thorax symmetric   Cardiovascular: Regular, rate and rhythm, no murmurs,  2+ equal pulses of the extremities, normal S 1and S 2   Gastrointestinal: Nondistended, soft, non-tender,  no rebound tenderness or guarding, no masses palpable, no organomegaly, +BS, no bruits   Extremities: normal extremities, no cyanosis edema,  contusions or wounds, no clubbing. Decreased ROM in LUE, strength limited 2/2 pain   Skin: Warm and dry, no lesions, no rashes     Recent Lab Results:    Results:    CBC: 9/28/2023 05:00              \     Hgb     /                              \     10.5 L    /  WBC  ----------------  Plt               10.0       ----------------    356              /     Hct     \                              /     33.6 L    \            RBC: 3.87 L    MCV: 87     Neutrophil %: 56.9      RFP: 9/28/2023 05:00  NA+        Cl-     BUN  /                         141    103    19  /  --------------------------------  Glucose                 ---------------------------  120 H    K+     HCO3-   Creat \                         4.0    29    1.05  \  Calcium : 9.0Anion Gap : 13          Albumin : 3.8     Phos : 5.0 H      Assessment and Plan:   Code Status:  ·  Code Status Full Code     Assessment:    FELIPA ROY is a 58 year old Female w/ PMHx significant for chart diagnosis of asthma (patient reports having PFTs ~4 years ago, no current access to those),  JAVI (not on CPAP last 3 months), HTN, HLD, DMII, osteoarthritis w/ L shoulder replacement and bilateral knee replacements who presents w/ SOB and found to have diffuse intrathoracic mediastinal lymphadenopathy. Per [facility]IE review of previous CT Scan reports  (not able to look at images, reports only available), there is no previous mention of adenopathy on previous scans. Given the patient's clinical picture, less likely to be infectious.    Now s/p EBUS w/ transbronchial lymph node biopsy showing granulomatous lymphadenitis. At present, is unclear if patient has sarcoidosis. She should see pulmonology as an outpatient and monitor for symptom and disease progression/recurrence/resolution.  We will arrange for follow up in 2-4 weeks in pulm clinic w/ 6 minute walk test and PFTs. We will plan for repeat HRCT chest in ~6 months to follow lymphadenopathy. Patient should be discharged on scheduled symbicort for better control of asthma related  symptoms    1. Granulomatous mediastinal lymphadenitis   2. Asthma/hyperactive airway disease (no PFTs to confirm asthma diagnosis)    Recommendations:  - Finish prednisone course today   - Follow up with pulm outpatient in 2-4 weeks with PFTs and 6MWT  - Will need repeat CT chest in 6 months to follow lymphadenopathy  - Scheduled symbicort 2 puffs twice daily   - Albuterol rescue inhaler PRN   - If this is sarcoidosis and this is her first occurrence, there is likelihood that this could resolve without treatment; continue to evaluate outpatient     Patient seen,  examined, and discussed with Dr. Teran.     Tonio Jauregui MD  PGY-1 Internal Medicine    Attestation:   Note Completion:  I am a:  Resident/Fellow   Attending Attestation I saw and evaluated the patient.  I personally obtained the key and critical portions of the history and physical exam or was physically present for key and  critical portions performed by the resident/fellow. I reviewed the resident/fellow?s documentation and discussed the patient with the resident/fellow.  I agree with the resident/fellow?s medical decision making as documented in the note.     I personally evaluated the patient on 28-Sep-2023         Electronic Signatures:  Tonio Jauregui (Resident))  (Signed 28-Sep-2023 16:32)   Authored: Service, Subjective Data, Objective Data, Assessment  and Plan, Note Completion  Ernesto Teran)  (Signed 29-Sep-2023 21:12)   Authored: Note Completion   Co-Signer: Service, Subjective Data, Objective Data, Assessment and Plan, Note Completion      Last Updated: 29-Sep-2023 21:12 by Ernesto Teran)

## 2023-09-30 NOTE — PROGRESS NOTES
Service: General Internal Medicine     Subjective Data:   FELIPA ROY is a 58 year old Female who is Hospital Day # 5.     RASHEEDA. Patient seen and examined at bedside this AM. Reports continued improvement in SOB. States she is still slightly wheezy (not appreciable on exam). Otherwise denies f/c, CP, abd pain, n/v, dysuria.    Objective Data:     Objective Information:      T   P  R  BP   MAP  SpO2   Value  36.6  74  20  166/104   119  95%  Date/Time 9/28 11:56 9/28 11:56 9/28 11:56 9/28 11:56  9/28 9:13 9/28 11:56  Range  (35.2C - 37.1C )  (63 - 83 )  (18 - 20 )  (146 - 194 )/ (70 - 114 )  (97 - 119 )  (93% - 100% )  Highest temp of 37.1 C was recorded at 9/27 0:08      Pain reported at 9/28 3:49: 7 = Severe    Physical Exam Narrative:  ·  Physical Exam:    General: awake, alert, conversant, appears stated age  HENT: NCAT, PERRL, MMM  Eyes: no scleral icterus or conjunctival pallor   Skin: no suspect lesions or rashes noted on visible skin  Cardiac: RRR, normal S1, S2, no M/R/G  Pulm: CTAB, normal respiratory effort, on room air  Abdomen: soft, ND, NT, no involuntary guarding or rebound tenderness  : no flank pain  or indwelling urinary catheter  EXT: 1+ pitting edema to mid-shin in both legs, no asymmetry noted  MSK: no focal joint  swelling noted, sensation and strength intact 5/5 in all URE, LLE, LRE; ULE strength 4/5   Neuro: AOx3, able to follow commands, no gross FND  Psych: coherent thought process, appropriate mood and affect    Recent Lab Results:    Results:    CBC: 9/28/2023 05:00              \     Hgb     /                              \     10.5 L    /  WBC  ----------------  Plt               10.0       ----------------    356              /     Hct     \                              /     33.6 L    \            RBC: 3.87 L    MCV: 87     Neutrophil %: 56.9      RFP: 9/28/2023 05:00  NA+        Cl-     BUN  /                         141    103    19   /  --------------------------------  Glucose                ---------------------------  120 H    K+     HCO3-   Creat \                         4.0    29    1.05  \  Calcium : 9.0Anion Gap : 13          Albumin : 3.8     Phos : 5.0 H      Radiology Results:    Results:        Impression:    No sonographic evidence for deep vein thrombosis within the evaluated  veins of the bilateral lower extremity.     MACRO:   US Venous Duplex Lower Extremity Veins Bilateral [Sep 25 2023  6:01AM]      Impression:     [No sonographic evidence for deep vein thrombosis within the evaluated veins of the [bilateral lower extremity].]    MACRO:  [ US Venous Duplex Lower Extremity Veins Bilateral [Sep 24 2023  9:56PM]      Impression:    1. No evidence of acute pulmonary embolism. Within the main pulmonary  artery or its branches to  proximal segmental level. Please note  that, assessment of distal segmental and subsegmental branches is  limited and small peripheral emboli are not entirely excluded. The  main pulmonary artery is prominent at 2.8 cm.  2. There is extensive intrathoracic lymphadenopathy,  includingmediastinal and bilateral perihilar lymphadenopathy.  Additionally, there is a large 3.6 x 3.4 cm x 4.0 cm soft tissue  density within the mediastinum. This likely represents a conglomerate  of right paratracheal nodes, however unable to exclude a soft tissue  mass. Lastly, there is bilateral pleural thickening,  left-greater-than-right. An underlying neoplastic  process/lymphoproliferative disorder is not excluded. Oncology  consult is recommended and PET-CT can be obtained as clinically  warranted.  3. Incidental note is made of a small amount of venous collaterals  within the anterior chest wall. There is no evidence of venous  thrombosis within the visualized venous structures, however this is  somewhat limited due to beam hardening artifact from contrast bolus  within the left subclavian vein.       CT Angio Chest for  PE [Sep 24 2023  2:33PM]      Assessment and Plan:   Daily Risk Screen:  ·  Does patient have an indwelling urinary catheter? n/a consulting service   ·  Does patient have a central line? n/a consulting service     Comorbidities:  ·  Comorbidity obesity   ·  Obesity morbid obesity (BMI 40+)   ·  BMI 54.05     Code Status:  ·  Code Status Full Code     Assessment:    Marian Zhou is a 57 yo female with PMHx asthma, hypertension, T2DM, obesity,  JAVI (not on CPAP), and hyperlipidemia who is presenting for SOB that has not subjectively improved with treatment and for workup of mediastinal mass. Differential for mediastinal mass includes  malignancy vs reactive lymphadenopathy vs sarcoidosis vs infectious process. Low concern for infectious etiology given stable vitals,  no leukocytosis, and no evidence of definite infection on imaging. Shortness of breath ongoing is reassured by stable vitals and benign pulmonary exam. EBUS 9/27 did not identify any gross abnormality,  biopsies taken of 3 lymph nodes.      Updates 9/28:  -EBUS 9/27: granulomatous inflammation, no atypical lymphocytes, BAL WBC 58 (71% mononuclear, 25% lymphocytes, 3% neutrophils, 1% eosinophils), lymph node biopsies pending  -pulm following, appreciate recs -- low suspicion for malignancy or infectious etiology, may be sarcoid   -Auto CPAP at night   -c/w 40 PO prednisone (9/24-9/28) and nebulizer regimen   -dc home this PM pending education with RT       #SOB   #Asthma  #JAVI (not on CPAP)  -  Patient received DuoNebs treatment, but reports that her symptoms return within a few hours  -  Patient started on steroids in the CDU  -  No increased work of breathing, wheezes, and is hemodynamically stable   -  9/24 CTPE: no acute PE, 9/25 b/l LE DVT US negative for DVT  -  SOB less likely 2/2 asthma exacerbation given lack of improvement with adequate treatment. Unlikely to be from PE given negative CT  and Duplex US findings, lack of O2 requirement.  Suspect SOB could be  related to mediastinal mass  []    Continue DuoNebs, albuterol, and pulmicort nebulizers  []    Continue PO prenisone 40 mg daily x 5 days (9/24-9/28)  []    Ensure auto CPAP nightly    #Mediastinal Mass  -  TH CT Angio 9/24 ruled out PE, but showed a large 3.6 x 3.4 cm x 4.0 cm soft tissue density within  the mediastinum  -  Patient reporting night sweats that require her to change her sheets and interfere with her sleep, but no fevers, chills, or unintended weight loss  -  Heme/Onc consulted for further evaluation -- requested pulm involvement for biopsy  -  EBUS 9/27: granulomatous inflammation, no atypical lymphocytes, BAL WBC 58 (71% mononuclear, 25% lymphocytes, 3% neutrophils, 1% eosinophils), lymph node biopsies pending  []    Pulm following,  appreciate recs -- low suspicion for malignancy or infectious etiology, may be sarcoid    #HTN  #L Shoulder pain   -  Patient BP at 205/97 on admission, reports not taking her medications  -  BP has remained elevated overnight to SBP 180s  -  Patient attributes higher BP to 6/10 L shoulder pain she is experiencing  -  Patient reports improvement in L shoulder pain with lidocaine patch and celecoxib  -  Bilateral 1+ pitting edema in lower extremities  [] C/w home metoprolol tartrate , HCTZ 25 mg daily, celecoxib 200 mg daily, lidocaine patch to L shoulder    #Diabetes  -  Patient has not been taking metformin per chart review  -  A1c 7.1% (9/26)  -  Blood sugars 90s-140s, does not currently appear to need anything beyond SSI   []  C/w sliding scale insulin    F: bolus PRN  E: replete K>4, Phos>3, Mg>2  N: Regular  A: PIV    DVT PPx: SCDs  GI PPx: N/A    CODE STATUS: Full Code (confirmed on admission)  SURROGATE DECISION MAKER: Christy Tang (oldest daughter) [phone]    Areli Boles MD  Internal Medicine PGY1      Attestation:   Note Completion:  I am a:  Resident/Fellow   Attending Attestation I saw and evaluated the patient.  I  personally obtained the key and critical portions of the history and physical exam or was physically present for key and  critical portions performed by the resident/fellow. I reviewed the resident/fellow?s documentation and discussed the patient with the resident/fellow.  I agree with the resident/fellow?s medical decision making as documented in the note.     I personally evaluated the patient on 28-Sep-2023         Electronic Signatures:  Tanya Sin)  (Signed 28-Sep-2023 17:07)   Authored: Note Completion   Co-Signer: Service, Subjective Data, Objective Data, Assessment and Plan, Note Completion  Areli Boles (Resident))  (Signed 28-Sep-2023 13:24)   Authored: Service, Subjective Data, Objective Data, Assessment  and Plan, Note Completion      Last Updated: 28-Sep-2023 17:07 by Tanya Sin)

## 2023-10-02 ENCOUNTER — PATIENT OUTREACH (OUTPATIENT)
Dept: CARE COORDINATION | Facility: CLINIC | Age: 58
End: 2023-10-02
Payer: COMMERCIAL

## 2023-10-02 SDOH — ECONOMIC STABILITY: GENERAL: WOULD YOU LIKE HELP WITH ANY OF THE FOLLOWING NEEDS?: I DONT NEED HELP WITH ANY OF THESE

## 2023-10-02 NOTE — PROGRESS NOTES
Outreach call to patient to support a smooth transition of care from recent admission.  Spoke with patient, reviewed discharge medications, discharge instructions, assessed social needs, and provided education on importance of follow-up appointment with provider. Enrolled patient in [employer] for additional support and education through transition period.  Will continue to monitor through transition period.    Engagement  Call Start Time: 1052 (10/2/2023 10:54 AM)    Medications  Is the patient having any side effects they believe may be caused by any medication additions or changes?: No (10/2/2023 10:54 AM)  Does the patient have all medications ordered at discharge?: Yes (10/2/2023 10:54 AM)  Care Management Interventions: No intervention needed (10/2/2023 10:54 AM)  Is the patient taking all medications as directed (includes completed medication regime)?: Yes (10/2/2023 10:54 AM)    Appointments  Does the patient have a primary care provider?: Yes (10/2/2023 10:54 AM)  Care Management Interventions: Verified appointment date/time/provider (Patient had follow up with her PCP) (10/2/2023 10:54 AM)  Follow Up Tasks: Appointments (Patient is waiting on pulmonology to call and set up initial appointment.  Patient provided with scheduling phone number.) (10/2/2023 10:54 AM)    Patient Teaching  Does the patient have access to their discharge instructions?: Yes (10/2/2023 10:54 AM)  Care Management Interventions: Educated on MyChart (Link sent for Mychart activation) (10/2/2023 10:54 AM)  What is the patient's perception of their health status since discharge?: Improving (10/2/2023 10:54 AM)  Is the patient/caregiver able to teach back the hierarchy of who to call/visit for symptoms/problems? PCP, Specialist, Home Health nurse, Urgent Care, ED, 911: Yes (10/2/2023 10:54 AM)    Wrap Up  Wrap Up Additional Comments: Patient provided information on Mychart and link sent.  Patient also given number to set up her  initial appointment for pulmonary as they have not called her yet.   Patient understands that she can call RN CM with any additional questions. (10/2/2023 10:54 AM)  Call End Time: 1057 (10/2/2023 10:54 AM)      Fiona DANIEL RN [facility]  [phone]

## 2023-10-04 LAB
COMPLETE PATHOLOGY REPORT: NORMAL
CONVERTED CLINICAL DIAGNOSIS-HISTORY: NORMAL
CONVERTED DIAGNOSIS COMMENT: NORMAL
CONVERTED FINAL DIAGNOSIS: NORMAL
CONVERTED FINAL REPORT PDF LINK TO COPY AND PASTE: NORMAL
CONVERTED RAPID EVALUATION: NORMAL
CONVERTED SPECIMEN DESCRIPTION: NORMAL

## 2023-10-05 ENCOUNTER — DOCUMENTATION (OUTPATIENT)
Dept: PULMONOLOGY | Facility: HOSPITAL | Age: 58
End: 2023-10-05
Payer: COMMERCIAL

## 2023-10-05 NOTE — PROGRESS NOTES
Results from 09/27/2023 bronchoscopy reviewed (inpatient procedure). They have been addressed by the inpatient Pulmonary Medicine Consult team. Multiple lymph nodes show non-caseating granulomas, CD4:8 on the BAL is 8.67.    Guille Gatica MD  Staff Physician - Interventional Pulmonology  3:50 PM  10/05/23

## 2023-10-09 LAB
FUNGAL CULTURE/SMEAR: ABNORMAL
FUNGAL SMEAR: ABNORMAL

## 2023-10-12 ENCOUNTER — APPOINTMENT (OUTPATIENT)
Dept: PULMONOLOGY | Facility: HOSPITAL | Age: 58
End: 2023-10-12
Payer: COMMERCIAL

## 2023-10-16 LAB
FUNGAL CULTURE/SMEAR: NORMAL
FUNGAL SMEAR: NORMAL

## 2023-11-01 PROBLEM — M19.90 ARTHRITIS: Status: ACTIVE | Noted: 2023-11-01

## 2023-11-01 PROBLEM — N92.1 MENORRHAGIA WITH IRREGULAR CYCLE: Status: ACTIVE | Noted: 2023-11-01

## 2023-11-01 PROBLEM — B96.89 BACTERIAL VAGINOSIS: Status: ACTIVE | Noted: 2023-11-01

## 2023-11-01 PROBLEM — N76.0 BACTERIAL VAGINOSIS: Status: ACTIVE | Noted: 2023-11-01

## 2023-11-01 PROBLEM — D25.1 INTRAMURAL AND SUBMUCOUS LEIOMYOMA OF UTERUS: Status: ACTIVE | Noted: 2023-11-01

## 2023-11-01 PROBLEM — E66.9 OBESE: Status: ACTIVE | Noted: 2023-11-01

## 2023-11-01 PROBLEM — G56.03 CARPAL TUNNEL SYNDROME ON BOTH SIDES: Status: ACTIVE | Noted: 2023-11-01

## 2023-11-01 PROBLEM — Z96.612 PRESENCE OF LEFT ARTIFICIAL SHOULDER JOINT: Status: ACTIVE | Noted: 2023-06-21

## 2023-11-01 PROBLEM — D25.0 INTRAMURAL AND SUBMUCOUS LEIOMYOMA OF UTERUS: Status: ACTIVE | Noted: 2023-11-01

## 2023-11-01 PROBLEM — M25.512 CHRONIC LEFT SHOULDER PAIN: Status: ACTIVE | Noted: 2023-08-10

## 2023-11-01 PROBLEM — G89.29 CHRONIC LEFT SHOULDER PAIN: Status: ACTIVE | Noted: 2023-08-10

## 2023-11-01 RX ORDER — HYDROCHLOROTHIAZIDE 25 MG/1
TABLET ORAL
COMMUNITY
Start: 2011-08-17 | End: 2023-11-29 | Stop reason: WASHOUT

## 2023-11-01 RX ORDER — MONTELUKAST SODIUM 10 MG/1
TABLET ORAL
COMMUNITY
Start: 2022-12-20

## 2023-11-01 RX ORDER — CHLORZOXAZONE 375 MG/1
TABLET ORAL
COMMUNITY
Start: 2012-09-18

## 2023-11-01 RX ORDER — TRIAMTERENE AND HYDROCHLOROTHIAZIDE 75; 50 MG/1; MG/1
TABLET ORAL
COMMUNITY
Start: 2023-09-29

## 2023-11-01 RX ORDER — IPRATROPIUM BROMIDE AND ALBUTEROL SULFATE 2.5; .5 MG/3ML; MG/3ML
SOLUTION RESPIRATORY (INHALATION)
COMMUNITY
Start: 2023-09-28

## 2023-11-01 RX ORDER — METFORMIN HYDROCHLORIDE 1000 MG/1
1000 TABLET ORAL
COMMUNITY
End: 2023-11-29 | Stop reason: WASHOUT

## 2023-11-01 RX ORDER — PHENTERMINE HYDROCHLORIDE 37.5 MG/1
TABLET ORAL
COMMUNITY
Start: 2023-01-11 | End: 2024-04-08 | Stop reason: ALTCHOICE

## 2023-11-01 RX ORDER — ALBUTEROL SULFATE 0.83 MG/ML
SOLUTION RESPIRATORY (INHALATION)
COMMUNITY
Start: 2023-10-02

## 2023-11-01 RX ORDER — OXYCODONE HYDROCHLORIDE 5 MG/1
TABLET ORAL
COMMUNITY
End: 2023-11-29 | Stop reason: ALTCHOICE

## 2023-11-01 RX ORDER — BUDESONIDE AND FORMOTEROL FUMARATE DIHYDRATE 160; 4.5 UG/1; UG/1
AEROSOL RESPIRATORY (INHALATION)
COMMUNITY
Start: 2023-09-28 | End: 2024-03-28 | Stop reason: SDUPTHER

## 2023-11-01 RX ORDER — METHOCARBAMOL 500 MG/1
500 TABLET, FILM COATED ORAL 3 TIMES DAILY
COMMUNITY
Start: 2017-04-28 | End: 2023-11-29 | Stop reason: WASHOUT

## 2023-11-01 RX ORDER — METOPROLOL TARTRATE 100 MG/1
100 TABLET ORAL 2 TIMES DAILY
COMMUNITY
Start: 2023-09-28

## 2023-11-01 RX ORDER — OXYCODONE AND ACETAMINOPHEN 10; 325 MG/1; MG/1
1 TABLET ORAL
COMMUNITY
End: 2023-11-29 | Stop reason: ALTCHOICE

## 2023-11-01 RX ORDER — TRAMADOL HYDROCHLORIDE 50 MG/1
50 TABLET ORAL EVERY 6 HOURS PRN
COMMUNITY

## 2023-11-01 RX ORDER — ALBUTEROL SULFATE 90 UG/1
2 AEROSOL, METERED RESPIRATORY (INHALATION)
COMMUNITY
End: 2024-03-28 | Stop reason: SDUPTHER

## 2023-11-01 RX ORDER — PREGABALIN 150 MG/1
150 CAPSULE ORAL 2 TIMES DAILY
COMMUNITY
Start: 2016-11-22 | End: 2023-11-29 | Stop reason: ALTCHOICE

## 2023-11-01 RX ORDER — MELATONIN 10 MG
1 TABLET, SUBLINGUAL SUBLINGUAL NIGHTLY
COMMUNITY
Start: 2023-09-15

## 2023-11-01 RX ORDER — METHYLPREDNISOLONE 4 MG/1
TABLET ORAL
COMMUNITY
Start: 2020-09-14 | End: 2023-11-29 | Stop reason: ALTCHOICE

## 2023-11-01 RX ORDER — MORPHINE SULFATE 30 MG/1
TABLET ORAL
COMMUNITY
End: 2023-11-29 | Stop reason: ALTCHOICE

## 2023-11-01 RX ORDER — TOPIRAMATE 25 MG/1
25 TABLET ORAL DAILY
COMMUNITY
Start: 2023-01-11 | End: 2023-11-29 | Stop reason: ALTCHOICE

## 2023-11-01 RX ORDER — CLOBETASOL PROPIONATE 0.5 MG/G
CREAM TOPICAL 2 TIMES DAILY
COMMUNITY
Start: 2023-04-18 | End: 2023-11-29 | Stop reason: ALTCHOICE

## 2023-11-01 RX ORDER — GABAPENTIN 100 MG/1
100 CAPSULE ORAL 3 TIMES DAILY
COMMUNITY
Start: 2012-08-21

## 2023-11-01 RX ORDER — PREGABALIN 200 MG/1
200 CAPSULE ORAL DAILY
COMMUNITY
Start: 2023-06-07

## 2023-11-01 RX ORDER — METFORMIN HYDROCHLORIDE 500 MG/1
500 TABLET ORAL DAILY
COMMUNITY
Start: 2023-07-28

## 2023-11-01 RX ORDER — LOSARTAN POTASSIUM 25 MG/1
25 TABLET ORAL DAILY
COMMUNITY
Start: 2023-09-28 | End: 2023-11-29 | Stop reason: WASHOUT

## 2023-11-01 RX ORDER — ATORVASTATIN CALCIUM 20 MG/1
20 TABLET, FILM COATED ORAL DAILY
COMMUNITY
Start: 2023-07-28

## 2023-11-01 RX ORDER — CELECOXIB 200 MG/1
CAPSULE ORAL
COMMUNITY
Start: 2017-03-09

## 2023-11-01 RX ORDER — HYDROCHLOROTHIAZIDE 12.5 MG/1
TABLET ORAL
COMMUNITY
Start: 2020-09-14 | End: 2023-11-29 | Stop reason: ALTCHOICE

## 2023-11-01 RX ORDER — LOSARTAN POTASSIUM 100 MG/1
100 TABLET ORAL DAILY
COMMUNITY
Start: 2023-09-27 | End: 2023-11-29 | Stop reason: WASHOUT

## 2023-11-01 RX ORDER — PREGABALIN 75 MG/1
75 CAPSULE ORAL 2 TIMES DAILY
COMMUNITY
Start: 2016-12-12 | End: 2023-11-29 | Stop reason: WASHOUT

## 2023-11-01 RX ORDER — AMLODIPINE BESYLATE 10 MG/1
TABLET ORAL
COMMUNITY
Start: 2022-12-16 | End: 2023-11-29 | Stop reason: WASHOUT

## 2023-11-02 ENCOUNTER — OFFICE VISIT (OUTPATIENT)
Dept: PULMONOLOGY | Facility: HOSPITAL | Age: 58
End: 2023-11-02
Payer: COMMERCIAL

## 2023-11-02 VITALS
BODY MASS INDEX: 51.45 KG/M2 | HEART RATE: 73 BPM | SYSTOLIC BLOOD PRESSURE: 118 MMHG | WEIGHT: 290 LBS | TEMPERATURE: 98.3 F | DIASTOLIC BLOOD PRESSURE: 82 MMHG | OXYGEN SATURATION: 95 %

## 2023-11-02 DIAGNOSIS — D86.9 SARCOIDOSIS: ICD-10-CM

## 2023-11-02 DIAGNOSIS — J45.40 MODERATE PERSISTENT ASTHMA, UNSPECIFIED WHETHER COMPLICATED (HHS-HCC): ICD-10-CM

## 2023-11-02 DIAGNOSIS — I88.8 GRANULOMATOUS LYMPHADENITIS: Primary | ICD-10-CM

## 2023-11-02 DIAGNOSIS — R09.81 CONGESTION OF NASAL SINUS: ICD-10-CM

## 2023-11-02 PROCEDURE — 99214 OFFICE O/P EST MOD 30 MIN: CPT | Mod: GC | Performed by: STUDENT IN AN ORGANIZED HEALTH CARE EDUCATION/TRAINING PROGRAM

## 2023-11-02 PROCEDURE — G0463 HOSPITAL OUTPT CLINIC VISIT: HCPCS | Performed by: STUDENT IN AN ORGANIZED HEALTH CARE EDUCATION/TRAINING PROGRAM

## 2023-11-02 PROCEDURE — 3074F SYST BP LT 130 MM HG: CPT | Performed by: STUDENT IN AN ORGANIZED HEALTH CARE EDUCATION/TRAINING PROGRAM

## 2023-11-02 PROCEDURE — 99204 OFFICE O/P NEW MOD 45 MIN: CPT | Performed by: STUDENT IN AN ORGANIZED HEALTH CARE EDUCATION/TRAINING PROGRAM

## 2023-11-02 PROCEDURE — 3079F DIAST BP 80-89 MM HG: CPT | Performed by: STUDENT IN AN ORGANIZED HEALTH CARE EDUCATION/TRAINING PROGRAM

## 2023-11-02 RX ORDER — FLUTICASONE PROPIONATE 50 MCG
1 SPRAY, SUSPENSION (ML) NASAL DAILY
Qty: 16 G | Refills: 1 | Status: SHIPPED | OUTPATIENT
Start: 2023-11-02 | End: 2023-11-29

## 2023-11-02 SDOH — ECONOMIC STABILITY: FOOD INSECURITY: WITHIN THE PAST 12 MONTHS, THE FOOD YOU BOUGHT JUST DIDN'T LAST AND YOU DIDN'T HAVE MONEY TO GET MORE.: NEVER TRUE

## 2023-11-02 SDOH — ECONOMIC STABILITY: FOOD INSECURITY: WITHIN THE PAST 12 MONTHS, YOU WORRIED THAT YOUR FOOD WOULD RUN OUT BEFORE YOU GOT MONEY TO BUY MORE.: NEVER TRUE

## 2023-11-02 ASSESSMENT — PATIENT HEALTH QUESTIONNAIRE - PHQ9
1. LITTLE INTEREST OR PLEASURE IN DOING THINGS: NOT AT ALL
SUM OF ALL RESPONSES TO PHQ9 QUESTIONS 1 & 2: 0
2. FEELING DOWN, DEPRESSED OR HOPELESS: NOT AT ALL

## 2023-11-02 ASSESSMENT — LIFESTYLE VARIABLES
SKIP TO QUESTIONS 9-10: 0
HOW OFTEN DO YOU HAVE SIX OR MORE DRINKS ON ONE OCCASION: LESS THAN MONTHLY
HOW OFTEN DO YOU HAVE A DRINK CONTAINING ALCOHOL: MONTHLY OR LESS
AUDIT-C TOTAL SCORE: 3
HOW MANY STANDARD DRINKS CONTAINING ALCOHOL DO YOU HAVE ON A TYPICAL DAY: 3 OR 4

## 2023-11-02 ASSESSMENT — ASTHMA QUESTIONNAIRES
ACT_TOTALSCORE: 20
QUESTION_1 LAST FOUR WEEKS HOW MUCH OF THE TIME DID YOUR ASTHMA KEEP YOU FROM GETTING AS MUCH DONE AT WORK, SCHOOL OR AT HOME: A LITTLE OF THE TIME
QUESTION_5 LAST FOUR WEEKS HOW WOULD YOU RATE YOUR ASTHMA CONTROL: WELL CONTROLLED
QUESTION_3 LAST FOUR WEEKS HOW OFTEN DID YOUR ASTHMA SYMPTOMS (WHEEZING, COUGHING, SHORTNESS OF BREATH, CHEST TIGHTNESS OR PAIN) WAKE YOU UP AT NIGHT OR EARLIER THAN USUAL IN THE MORNING: ONCE A WEEK
QUESTION_4 LAST FOUR WEEKS HOW OFTEN HAVE YOU USED YOUR RESCUE INHALER OR NEBULIZER MEDICATION (SUCH AS ALBUTEROL): NOT AT ALL
QUESTION_2 LAST FOUR WEEKS HOW OFTEN HAVE YOU HAD SHORTNESS OF BREATH: 1 OR 2 TIMES PER WEEK

## 2023-11-02 ASSESSMENT — PAIN SCALES - GENERAL: PAINLEVEL: 5

## 2023-11-02 NOTE — PATIENT INSTRUCTIONS
Thank you for seeing us today.    Please follow up with our pulmonary clinic in  3 months    We spoke today about your bronchoscopy results showing sarcoid changes    We recommend:  1) Seeing Cardiology and Ophthalmology  2) Seeing Sleep medicine If you remain to have problem with the mask  3) Continue Symbicort 2 puffs twice daily and albuterol as needed  4) Pulmonary function tests, and FeNO (fraction of exhaled Nitrous Oxide) please schedule at    5) Please perform CXR chest xray before next visit  6) Please pass by lab for allergy panel     For scheduling purposes:     Call [phone] to schedule a breathing or a walking test      Call [phone] to schedule  EKG's, Echocardiograms and Cardiopulmonary Stress Tests.     Call [phone] to schedule Radiology tests such as Nuclear Medicine Stress Tests, CT Scans, and MRI's.     Should you have any questions Please Call my assistant Jeremiah Rodriguez at  or our pulmonary nurse Kate Akbar at [phone]     Lymphadenitis    About this topic  Lymphadenitis is another word for a swollen lymph node. A lymph node is a pea-shaped part of the lymph system that helps defend our body from germs. You have lymph nodes all over your body. They help trap foreign material and other cells that do not belong inside the body.  A swollen lymph node may mean there is an infection or disease in the body. Treatment for this condition depends on the cause. Most often, you will need drugs to treat your illness.  What are the causes?  Lymph nodes can become swollen if you have an infection, some other swelling, or even because of a tumor. If you have an infection in a lymph node it is most often because there is an infection somewhere else in your body. Some drugs or illnesses can cause you to have swollen lymph nodes.  What are the main signs?  Your lymph node may be:  Swollen  Sore  Filled with pus  Draining fluid  Reddened skin over your lymph  node  Fever  Warm to the touch  You may have problems with lymph nodes on one or both sides of your body.  How does the doctor diagnose this health problem?  The doctor will ask you questions about your health history and do an exam. The doctor will feel your lymph nodes and compare them to your other lymph nodes. Your doctor may order:  Lab tests  Ultrasound  Biopsy  How does the doctor treat this health problem?  Your care is based on what is causing your swollen lymph nodes. In some cases, you may not need any treatment. In very serious cases, you may need surgery to drain an infection.  What drugs may be needed?  The doctor may order drugs to:  Help with pain and swelling  Fight an infection  Lower fever  Last Reviewed Date  2021-07-19  Consumer Information Use and Disclaimer  This generalized information is a limited summary of diagnosis, treatment, and/or medication information. It is not meant to be comprehensive and should be used as a tool to help the user understand and/or assess potential diagnostic and treatment options. It does NOT include all information about conditions, treatments, medications, side effects, or risks that may apply to a specific patient. It is not intended to be medical advice or a substitute for the medical advice, diagnosis, or treatment of a health care provider based on the health care provider's examination and assessment of a patient’s specific and unique circumstances. Patients must speak with a health care provider for complete information about their health, medical questions, and treatment options, including any risks or benefits regarding use of medications. This information does not endorse any treatments or medications as safe, effective, or approved for treating a specific patient. UpToDate, Inc. and its affiliates disclaim any warranty or liability relating to this information or the use thereof. The use of this information is governed by the Terms of Use, available at  "https://www.[employer].com/en/know/clinical-effectiveness-terms  Copyright © 2023 UpToDate, Inc. and its affiliates and/or licensors. All rights reserved.    Sarcoidosis    The Basics  Written by the doctors and editors at [employer]  What is sarcoidosis? -- Sarcoidosis (also called \"sarcoid\") is a disorder that causes clusters of abnormal tissue to form in the body. These clusters are called \"granulomas.\" If many granulomas form in an organ, the organ might not work normally. For example, granulomas in the lungs can cause breathing problems.  Sarcoidosis can affect many different organs. Often, it affects the lungs. But it can also affect the skin, lymph nodes (small, bean-shaped organs under the skin), eyes, nose, heart, and other body parts.  What are the symptoms of sarcoidosis? -- Sarcoidosis causes different symptoms depending on which body part it affects. The symptoms are usually mild and go away on their own.  When it affects the lungs, sarcoidosis can cause:  ?Cough  ?Trouble breathing  ?Chest pain  ?Tiredness or weakness  ?Fever  ?Weight loss  When it affects the skin, sarcoidosis can cause a mild rash or painful bumps. Sometimes, the rash and bumps go away completely. Other times, they leave a scar.  Sometimes, sarcoidosis causes no symptoms even though it is damaging certain organs. For this reason, people with sarcoidosis might need to have tests to check for organ damage.  How do I know if I have sarcoidosis? -- There is no single test that can tell if you have sarcoidosis. To diagnose it, doctors and nurses look at:  ?Your symptoms and physical exam  ?X-rays (or special X-rays called CT scans)  ?Electrocardiogram (a test that measures the electrical activity in your heart)  ?Lab work on tissue taken from your body (called a biopsy)  ?Tests that can rule out other causes of your condition  Is there anything I can do to feel better? -- Yes. These things can help:  ?If you smoke, stop smoking. Even " though smoking does not cause sarcoidosis, it can make your breathing worse.  ?Get the flu vaccine every year. Talk to your doctor about whether you should have a pneumonia vaccine, too.  ?Get regular check-ups with your doctor, which will include lab tests. You should also see an eye doctor at least once after you are diagnosed, and again if you have any new changes to your vision.  ?Get regular exercise, and stay as active as you can.  How is sarcoidosis treated? -- Doctors and nurses do not know what causes sarcoidosis, so there is no cure. But there are ways to treat the symptoms. If your symptoms are mild, you might not need treatment.  Steroid medicines can relieve the symptoms of sarcoidosis and prevent some of the damage it can cause. These medicines reduce inflammation. They work by shrinking the granulomas caused by sarcoidosis. Sometimes, people can use skin creams, eye drops, and inhalers with steroids in them. Other times, if their symptoms are more severe, they take steroid pills.  Even though steroid pills can help with the problems caused by sarcoidosis, they can also cause problems of their own. For instance, steroids can cause weight gain and mood swings, and make diabetes worse. For this reason, doctors and nurses try to lower the dose of steroids and take people off of them as soon as possible.  Medicines other than steroids can also treat the granulomas caused by sarcoidosis. The most common one is methotrexate (sample brand names: Rheumatrex, Trexall). Doctors often prescribe methotrexate so that steroid medicines can be decreased or stopped. They also use it for people who cannot take steroids or do not get better with steroids.  How will sarcoidosis affect my life? -- Sarcoidosis usually goes away on its own or does not get worse. Most people with sarcoidosis can live a normal life. Sometimes, sarcoidosis does get worse, and people might need treatment on and off. Some people need to take  medicine for several years.  It is rare for sarcoidosis to cause permanent organ damage. It is very rare for a person to die from sarcoidosis.  All topics are updated as new evidence becomes available and our peer review process is complete.  This topic retrieved from [employer] on: Oct 19, 2023.  Topic 20492 Version 10.0  Release: 31.4.2 - C31.291  © 2023 UpToDate, Inc. and/or its affiliates. All rights reserved.  Consumer Information Use and Disclaimer  This generalized information is a limited summary of diagnosis, treatment, and/or medication information. It is not meant to be comprehensive and should be used as a tool to help the user understand and/or assess potential diagnostic and treatment options. It does NOT include all information about conditions, treatments, medications, side effects, or risks that may apply to a specific patient. It is not intended to be medical advice or a substitute for the medical advice, diagnosis, or treatment of a health care provider based on the health care provider's examination and assessment of a patient's specific and unique circumstances. Patients must speak with a health care provider for complete information about their health, medical questions, and treatment options, including any risks or benefits regarding use of medications. This information does not endorse any treatments or medications as safe, effective, or approved for treating a specific patient. UpToDate, Inc. and its affiliates disclaim any warranty or liability relating to this information or the use thereof.The use of this information is governed by the Terms of Use, available at https://www.[employer].com/en/know/clinical-effectiveness-terms ©2023 UpToDate, Inc. and its affiliates and/or licensors. All rights reserved.  © 2023 UpToDate, Inc. and/or its affiliates. All rights reserved.    Dear Marian Zhou Thank you for visiting the Pulmonary clinic today! We discussed the following:

## 2023-11-02 NOTE — PROGRESS NOTES
Department of Medicine  Division of Pulmonary, Critical Care, and Sleep Medicine  Consultation  [address] [facility]    I was asked by Pulmonary consult team Dr. Teran to evaluate patient for sarcoidosis . I have independently interviewed and examined the patient in the office and reviewed available records.    Physician HPI (11/2/2023):  58 y old with hx of SVT s/p ablation of accessory bypass tract, HTN, fibromyalgia, sleep apnea on CPAP, asthma, DM, obesity, DL, and mediastinal LN presenting for follow up after hospital stay where bronchoscopy showed evidence of non caseating granulomas.     Hospital stay from 9/25-9/28: presenting for a 1-2 week history of shortness of breath attributed  to an asthma exacerbation admitted for further evaluation of mediastinal mass found incidentally on CT. Upon arrival to ED patient was afebrile and hypertensive to 205/97 but non-tachycardic and saturating 97% RA. CBC, RFP, LFT, troponin x2, and BNP unremarkable. D-dimer elevated to 1149. CTPE negative for PE but did demonstrate extensive intrathoracic  lymphadenopathy as well as 3.6 x 3.4 x 4.0 cm soft tissue density within the mediastinum. Patient received Duonebs, albuterol nebulizer, and 40 mg PO prednisone in the ED and was admitted to CDU for monitoring of asthma symptoms. Despite Duonebs, budesonide,  albuterol, and PO prednisone, patient's shortness of breath did not subjectively improve so she was ultimately admitted for further evaluation of mediastinal mass.  Pulmonology saw the patient who recommended EBUS. Recommended 5 day course  of PO prednisone (9/24-9/28). . Rapid On-Site Evaluation preliminary report suggestive of granulomatous tissue in node level 4R, 7, and 11Rs with final report pending. No atypical  lymphocytes seen but there was granulomatous inflammation noted. BAL studies showing WBC 58 (71% mononuclear cells, 25% lymphocytes, 3% neutrophils, 1% eosinophils). Per pulmonology, highest  suspicion for granulomatous lymphadenitis. Multiple lymph nodes show non-caseating granulomas, CD4:8 on the BAL is 8.67.candida in culture in BAL. But AFB and fungal stain neg. No airway inflammation     Pulmonary Recommendations inpatinet. Only 5 days or prednisone/ Follow up with pulm outpatient in 2-4 weeks with PFTs/ Scheduled symbicort 2 puffs twice daily /Albuterol rescue inhaler PRN./ if this is sarcoidosis and this is her first occurrence, there is likelihood that this could resolve without treatment; continue to evaluate outpatient      11/2/2023:  Today patient feeling better. She however did have a recent cold where she had nasal congestion, sore throat, productive cough and wheezing. Patients PCP gave her z pack for 5 days which helped. Did a covid test was negative. Currently she afebrile, not wheezing, and not dyspneic, only remains to have dry cough with some night onset could be related to post nasal drip and congestion. She is sneezing though. Has not needed rescue inhaler maybe just once this week. After hospital stay needed Nebulizer machine with nebulizers for 1 week almost once or twice. Remains to have night sweats since her menopause. Denies joint issues other than knee replacement but no serositis sx, denies skin issues or eye inflammation. No unintentional weight loss.     She remains to use Symbicort 2 puffs BID with mouth wash    Has not been using her CPAP due to mask issues and needs refitting. Uses nasal mask. Follows with sleep medicine Outside . No chest pain or palpitations. Had hx of SVT many years ago when she was working and using energy drinks.     Asthma dx 4 years ago, weather changes and fumes bother her. Was on inhaler but no prior PFT    She was using pool for wt loss and for her shoulder pain. Can go up flight of stairs but needs break after 2-3 stairwell.     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   Immunization History:  Immunization History   Administered Date(s) Administered    Hep A / Hep B 12/01/2015    Influenza, seasonal, injectable 12/01/2015    Pfizer Purple Cap SARS-CoV-2 08/16/2021, 09/06/2021    Tdap vaccine, age 7 year and older (BOOSTRIX) 12/01/2015       Family History:  Family History   Problem Relation Name Age of Onset    Hypertension Mother          Essential    Diabetes Mother      Rheum arthritis Mother      Rheum arthritis Daughter         Social History:  Social History     Socioeconomic History    Marital status: Single     Spouse name: None    Number of children: None    Years of education: None    Highest education level: None   Occupational History    None   Tobacco Use    Smoking status: Never    Smokeless tobacco: None   Substance and Sexual Activity    Alcohol use: None     Comment: ON OCCASSION    Drug use: Not Currently     Types: Marijuana    Sexual activity: None   Other Topics Concern    None   Social History Narrative    None     Social Determinants of Health     Financial Resource Strain: Not on file   Food Insecurity: No Food Insecurity (11/2/2023)    Hunger Vital Sign     Worried About Running Out of Food in the Last Year: Never true     Ran Out of Food in the Last Year: Never true   Transportation Needs: Not on file   Physical Activity: Not on file   Stress: Not on file   Social Connections: Not on file   Intimate Partner Violence: Not on file   Housing Stability: Not on file       Current Medications:  Current Outpatient Medications   Medication Instructions    albuterol 2.5 mg /3 mL (0.083 %) nebulizer solution     albuterol 90 mcg/actuation inhaler 2 puffs, inhalation    amLODIPine (Norvasc) 10 mg tablet     atorvastatin (LIPITOR) 20 mg, oral, Daily    celecoxib (CeleBREX) 200 mg capsule oral     chlorzoxazone (Lorzone) 375 mg tablet     clobetasol (Temovate) 0.05 % cream Topical, 2 times daily, apply sparingly to affected area    fluticasone (Flonase) 50 mcg/actuation nasal spray 1 spray, Each Nostril, Daily, Shake gently. Before first use, prime pump. After use, clean tip and replace cap.    gabapentin (NEURONTIN) 100 mg, oral, 3 times daily    hydroCHLOROthiazide (HYDRODiuril) 12.5 mg tablet oral    hydroCHLOROthiazide (HYDRODiuril) 25 mg tablet     ipratropium-albuteroL (Duo-Neb) 0.5-2.5 mg/3 mL nebulizer solution     losartan (COZAAR) 100 mg, oral, Daily    losartan (COZAAR) 25 mg, oral, Daily    melatonin 10 mg tablet, sublingual 1 tablet, oral, Nightly    metFORMIN (GLUCOPHAGE) 1,000 mg, oral    metFORMIN (GLUCOPHAGE) 500 mg, oral, Daily    methocarbamol (ROBAXIN) 500 mg, oral, 3 times daily    methylPREDNISolone (Medrol, Dominic,) 4 mg tablets oral    metoprolol tartrate (LOPRESSOR) 100 mg, oral, 2 times daily    montelukast (Singulair) 10 mg tablet     morphine (MSIR) 30 mg tablet oral    oxyCODONE (Roxicodone) 5 mg immediate release tablet ONE TAB BY MOUTH EVERY 6 HOURS IF NEEDED PAIN    oxyCODONE-acetaminophen (Percocet)  mg tablet 1 tablet, oral    phentermine (Adipex-P) 37.5 mg tablet 1 TABS ORAL DAILY FOR 28 DAYS    pregabalin (LYRICA) 150 mg, oral, 2 times daily    pregabalin (LYRICA) 75 mg, oral, 2 times daily    pregabalin (LYRICA) 200 mg, oral, Daily    Symbicort 160-4.5 mcg/actuation inhaler     topiramate (TOPAMAX) 25 mg, oral, Daily    traMADol (ULTRAM) 50 mg, oral, Every 6 hours PRN    triamterene-hydrochlorothiazid (Maxzide) 75-50 mg tablet         Drug Allergies/Intolerances:  Allergies   Allergen Reactions    Penicillins Hives        Review of Systems:  As above    All other review of systems are negative and/or non-contributory.    Physical Examination:  /82   Pulse 73   Temp 36.8 °C (98.3 °F)   Wt 132 kg (290 lb)   SpO2 95%   BMI 51.45 kg/m²      General: ambulated  independently; no acute distress; obese, work of breathing was not increased; normal vocal character  HEENT: no thrush  Chest: clear to auscultation bilaterally; decrease air entry on bases, no wheezing  Cardiac: regular rhythm  Extremities: mild edema  Psychiatric: did not appear depressed or anxious.    Pulmonary Function Test Results       Not done yet     Chest Radiograph     XR chest 2 view 09/24/2023    Narrative  Interpreted By:  EDWIN ERNST MD  MRN: [id number]  Patient Name: FELIPA ROY    STUDY:  TH CHEST 2 VIEW PA AND LAT;    INDICATION:  dyspnea .    COMPARISON:  None    ACCESSION NUMBER(S):  [id number]    ORDERING CLINICIAN:  JC MELENDEZ    FINDINGS:  The right hemidiaphragm is elevated, similar to prior radiographs.    Cardiomediastinal silhouette is non-enlarged.    Low lung volumes with resultant bronchovascular crowding. No focal  consolidation, pleural effusion, or pneumothorax.    No acute osseous changes. Status post left shoulder arthroplasty.    Impression  No acute cardiopulmonary process.    Similar elevation of the right hemidiaphragm compared to prior  radiographs.      Echocardiogram     No results found for this or any previous visit from the past 365 days.       Chest CT Scan     CT angio chest for pulmonary embolism 09/24/2023    Narrative  Interpreted By:  VIJAY CARRILLO MD and ERNESTINA LAMB MD  MRN: [id number]  Patient Name: FELIPA ROY    STUDY:  CT ANGIO CHEST FOR PE;  9/24/2023 1:18 pm    INDICATION:  r/o PE, Lie Flat: Yes .    COMPARISON:  Chest radiograph dated 09/24/2023.    ACCESSION NUMBER(S):  [id number]    ORDERING CLINICIAN:  JC MELENDEZ    TECHNIQUE:  Helical data acquisition of the chest was obtained after intravenous  administration of 80 milliliterOMNIPAQUE 350, as per PE protocol.  Images were reformatted in coronal and sagittal planes. Axial and  coronal maximum intensity projection (MIP) images were created and  reviewed.  In addition, dual energy  reconstructions were also performed  including low energy virtual mono-energetic images and iodine density  maps.    FINDINGS:  POTENTIAL LIMITATIONS OF THE STUDY: The assessment is limited by  respiratory motion and suboptimal contrast opacification of pulmonary  arteries.    HEART AND VESSELS:  No discrete filling defects within the main pulmonary artery or its  branches to  proximal segmental level. Please note that, assessment  of distal segmental and subsegmental branches is limited and small  peripheral emboli are not entirely excluded.  Main pulmonary artery is prominent at 2.8 cm.    The thoracic aorta normal in course and caliber.There is moderate  atherosclerosis present, including calcified and noncalcified  plaques.Although, the study is not tailored for evaluation of aorta,  there is no definite evidence of acute aortic pathology.  Moderate coronary artery calcifications are seen. Please note,the  study is not optimized for evaluation of coronary arteries.  The mild cardiomegaly.  There are somewhat prominent aortic valvular calcifications seen, and  correlate with concern for aortic stenosis.  There is no pericardial effusion seen.    MEDIASTINUM AND SIDDHARTH, LOWER NECK AND AXILLA:  The visualized thyroid gland is not included within the field of view.  There is extensive intrathoracic lymphadenopathy,  includingmediastinal and bilateral perihilar lymphadenopathy.  Representative examples include a 1.4 cm subaortic node (series 401,  image 64). Additionally, there is a large 3.6 x 3.4 cm x 4.0 cm soft  tissue density within the mediastinum (series 401, image 61 and  series 403, images 121). This likely represents a conglomerate of  right paratracheal nodes, however unable to exclude a soft tissue  mass. An underlying neoplastic process/lymphoproliferative disorder  is not excluded. Esophagus appears within normal limits as seen.    LUNGS AND AIRWAYS:  The trachea and central airways are patent. No  endobronchial lesion  is seen. There is mild diffuse bronchial wall thickening, which is a  non-specific finding, but may be due to chronic bronchitis/asthma.  There is evidence of bilateral pleural thickening,  left-greater-than-right, measuring up to 0.7 cm on the left (series  402, image 189).  There is smooth interlobular septal thickening, suggestive of  interstitial pulmonary edema. There is a diffuse increase in lung  attenuation with elevation of the bilateral diaphragm (most severe on  the right) and decreased cross-sectional lung area, which may be the  result of poor inspiratory effort. There is no evidence of  pneumothorax.    UPPER ABDOMEN:  Diffusely decreased attenuation of the liver parenchyma, consistent  with hepatic steatosis.    CHEST WALL AND OSSEOUS STRUCTURES:  Incidental note is made of a small amount of venous collaterals  within the anterior chest wall. There is no evidence of venous  thrombosis within the visualized venous structures, however this is  somewhat limited due to beam hardening artifact from contrast bolus  within the left subclavian vein. No acute osseous pathology.There are  no suspicious osseous lesions.Mild multilevel degenerative changes  within visualized spine.    Impression  1. No evidence of acute pulmonary embolism. Within the main pulmonary  artery or its branches to  proximal segmental level. Please note  that, assessment of distal segmental and subsegmental branches is  limited and small peripheral emboli are not entirely excluded. The  main pulmonary artery is prominent at 2.8 cm.  2. There is extensive intrathoracic lymphadenopathy,  includingmediastinal and bilateral perihilar lymphadenopathy.  Additionally, there is a large 3.6 x 3.4 cm x 4.0 cm soft tissue  density within the mediastinum. This likely represents a conglomerate  of right paratracheal nodes, however unable to exclude a soft tissue  mass. Lastly, there is bilateral pleural  thickening,  left-greater-than-right. An underlying neoplastic  process/lymphoproliferative disorder is not excluded. Oncology  consult is recommended and PET-CT can be obtained as clinically  warranted.  3. Incidental note is made of a small amount of venous collaterals  within the anterior chest wall. There is no evidence of venous  thrombosis within the visualized venous structures, however this is  somewhat limited due to beam hardening artifact from contrast bolus  within the left subclavian vein.    I personally reviewed the images/study and I agree with the findings  as stated. This study was interpreted at [place], [place].    MACRO:  None      Bronchoscopy 9/27/2023  BAL culture fungal candidam BAL lymph 25%  and mono 71%   FINAL CYTOLOGICAL INTERPRETATION    A.  FINE NEEDLE ASPIRATION - LN7  LYMPH NODE, CYTOLOGY AND CELL BLOCK:  -- NO MALIGNANT CELLS IDENTIFIED.  -- LYMPHOID SAMPLE AND NON-CASEATING GRANULOMAS PRESENT.         B.  FINE NEEDLE ASPIRATION - 4R  LYMPH NODE, CYTOLOGY AND CELL BLOCK:  -- NO MALIGNANT CELLS IDENTIFIED.  -- LYMPHOID SAMPLE AND NON-CASEATING GRANULOMAS PRESENT, SEE NOTE.    Note: GMS stain is negative for microorganisms.         C.  FINE NEEDLE ASPIRATION - 11RS  LYMPH NODE, CYTOLOGY AND CELL BLOCK:  -- NO MALIGNANT CELLS IDENTIFIED.  -- LYMPHOID SAMPLE AND NON-CASEATING GRANULOMAS PRESENT.         D.  BRONCHO-ALVEOLAR LAVAGE - LINGULA:  -- NO MALIGNANT CELLS OR VIRAL INCLUSIONS IDENTIFIED.  -- ALVEOLAR MACROPHAGES PRESENT.  -- GMS STAIN IS NEGATIVE FOR FUNGI AND PNEUMOCYSTIS ORGANISMS.    : Dr. Maricarmen Acuna        Slide(s) initially screened by a Cytotechnologist at [place] [address]      Electronically Signed Out By CHAR GUERRERO MD    By the signature on this report, the individual or group listed as making the  Final  Interpretation/Diagnosis certifies that they have reviewed this case.  Slide(s) initially screened by a Cytotechnologist at [facility]  Diagnostic interpretation performed at [facility] [address] [facility] [address]       Rapid Evaluation    Fine Needle Aspiration Immediate Read Result:  PASS A, B, & C:  Granulomas present.  No atypical lymphocytes seen.  RPMI sample from part A LN7 for potential flow cytometry.    Pathologist: Linda Mittal M.D.                   Date: 9/27/2023    Clinical History      MEDIASTINAL LYMPH NODES POSSIBLE SARCOIDOSIS   Source of Specimen  A: FINE NEEDLE ASPIRATION - LN7  LYMPH NODE  B: FINE NEEDLE ASPIRATION - 4R  LYMPH NODE  C: FINE NEEDLE ASPIRATION - 11RS  LYMPH NODE  D: BRONCHO-ALVEOLAR LAVAGE - LINGULA    Specimen Submitted as:  A:  FINE NEEDLE ASPIRATION - LN7  LYMPH NODE       Pap stain Non-Gyn, Diff-Quik stain Non-Gyn, CELL BLOCK, H&E, Initial, H  GOMORI'S METHENAMINE SILVER  B:  FINE NEEDLE ASPIRATION - 4R  LYMPH NODE       Pap stain Non-Gyn, Diff-Quik stain Non-Gyn, CELL BLOCK, H&E, Initial, H  GOMORI'S METHENAMINE SILVER  C:  FINE NEEDLE ASPIRATION - 11RS  LYMPH NODE       Pap stain Non-Gyn, Diff-Quik stain Non-Gyn, CELL BLOCK, H&E, Initial  D:  BRONCHO-ALVEOLAR LAVAGE - LINGULA       Pap stain Non-Gyn - cytospin, H GMS PCP    Gross Description  A.  FINE NEEDLE ASPIRATION - LN7  LYMPH NODE:  RECEIVED 2 DIRECT SMEARS (1  AIR-DRIED DIFF-QUIK AND 1 SPRAY-FIXED) AND 30cc OF PINK TINTED, CLEAR NEEDLE  RINSE IN CYTOLYT WITH PARTICLES.  ADDITIONAL SPECIMEN COLLECTED IN RPMI FOR  POTENTIAL FLOW CYTOMETRY.    B.  FINE NEEDLE ASPIRATION - 4R  LYMPH NODE:  RECEIVED 2 DIRECT SMEARS (1  AIR-DRIED DIFF-QUIK AND 1 SPRAY-FIXED) AND 30cc OF PINK, CLEAR NEEDLE RINSE IN  CYTOLYT WITH PARTICLES.    C.  FINE NEEDLE ASPIRATION - 11RS  LYMPH NODE:  RECEIVED 2 DIRECT SMEARS (1  AIR-DRIED DIFF-QUIK AND 1 SPRAY-FIXED) AND 30cc OF RED, CLEAR NEEDLE RINSE  IN  CYTOLYT WITH PARTICLES.    D.  BRONCHO-ALVEOLAR LAVAGE - LINGULA:  RECEIVED 5cc HAZY FLUID WITH SCANT  PARTICLES IN A CUP.  AN ADDITIONAL SLIDE WILL BE MADE FOR GMS STAIN.                  Assessment and Plan / Recommendations:  Problem List Items Addressed This Visit          Infectious Diseases    Granulomatous lymphadenitis - Primary     Other Visit Diagnoses       Sarcoidosis        Relevant Orders    Referral to Ophthalmology    Referral to Adult Sleep Medicine    Referral to Cardiology    Pulmonary function test    Follow Up In Pulmonology    XR chest 2 views    Moderate persistent asthma, unspecified whether complicated        Relevant Orders    Respiratory Allergy Profile IgE    Follow Up In Pulmonology    Congestion of nasal sinus        Relevant Medications    fluticasone (Flonase) 50 mcg/actuation nasal spray           58 y old with hx of SVT s/p ablation of accessory bypass tract, HTN, fibromyalgia, sleep apnea on CPAP, asthma, DM, obesity, DL, and mediastinal LN presenting for follow up after hospital stay where bronchoscopy showed evidence of non caseating granulomas.      Assessment: Stage I sarcoidosis.     #Sarcoidosis  Stage I   No airway involvement on bronchoscopy   Hx of SVT     -PFT  -Refer to ophthalmology, cardiology  -CXR before next visit  -LFT normal in hospital     #Asthma   No PFT   Mod persistent with recent exacerbation in September   Allergic component  No eosinophilia   MMRC 1, ACT of 20    Plan:  -PFT with FeNO  -Allergy panel  -Continue Symbicort BID 2 puffs with mouth rinse  -Continue albuterol PRN     #Recent cold and nasal congestion  Flonase     #JAVI  -Sleep medicine referral if cannot be mask refitted    #Obesity   Recommended talking to her PCP about GLP candidacy for wt loss  -Continue Pool swimming     #pleural thickening    Patient was seen and discussed with Dr. Ferreira  Follow-up: Visit date not found     Kaylene Phelps MD  11/02/2023

## 2023-11-06 NOTE — PROGRESS NOTES
I saw and evaluated the patient. I personally obtained the key and critical portions of the history and physical exam or was physically present for key and critical portions performed by the resident/fellow. I reviewed the resident/fellow's documentation and discussed the patient with the resident/fellow. I agree with the resident/fellow's medical decision making as documented in the note.    Jose Ferreira MD

## 2023-11-07 ENCOUNTER — HOSPITAL ENCOUNTER (OUTPATIENT)
Dept: RESPIRATORY THERAPY | Facility: HOSPITAL | Age: 58
Discharge: HOME | End: 2023-11-07
Payer: COMMERCIAL

## 2023-11-07 ENCOUNTER — LAB (OUTPATIENT)
Dept: LAB | Facility: LAB | Age: 58
End: 2023-11-07
Payer: COMMERCIAL

## 2023-11-07 DIAGNOSIS — J45.40 MODERATE PERSISTENT ASTHMA, UNSPECIFIED WHETHER COMPLICATED (HHS-HCC): ICD-10-CM

## 2023-11-07 DIAGNOSIS — D86.9 SARCOIDOSIS: ICD-10-CM

## 2023-11-07 DIAGNOSIS — J45.909 ASTHMA (HHS-HCC): ICD-10-CM

## 2023-11-07 DIAGNOSIS — D86.9 SARCOID: ICD-10-CM

## 2023-11-07 PROCEDURE — 82785 ASSAY OF IGE: CPT

## 2023-11-07 PROCEDURE — 94060 EVALUATION OF WHEEZING: CPT

## 2023-11-07 PROCEDURE — 86003 ALLG SPEC IGE CRUDE XTRC EA: CPT

## 2023-11-07 PROCEDURE — 36415 COLL VENOUS BLD VENIPUNCTURE: CPT

## 2023-11-08 LAB
A ALTERNATA IGE QN: 2.55 KU/L
A FUMIGATUS IGE QN: <0.1 KU/L
BERMUDA GRASS IGE QN: 0.11 KU/L
BOXELDER IGE QN: <0.1 KU/L
C HERBARUM IGE QN: <0.1 KU/L
CALIF WALNUT POLN IGE QN: 0.12
CAT DANDER IGE QN: 0.37 KU/L
CMN PIGWEED IGE QN: <0.1 KU/L
COMMON RAGWEED IGE QN: 0.7 KU/L
COTTONWOOD IGE QN: <0.1 KU/L
D FARINAE IGE QN: 0.66 KU/L
D PTERONYSS IGE QN: 0.54 KU/L
DOG DANDER IGE QN: 0.56 KU/L
ENGL PLANTAIN IGE QN: <0.1
GOOSEFOOT IGE QN: <0.1 KU/L
JOHNSON GRASS IGE QN: 0.22 KU/L
KENT BLUE GRASS IGE QN: 2.12 KU/L
LONDON PLANE IGE QN: <0.1
MT JUNIPER IGE QN: 0.18
P NOTATUM IGE QN: <0.1 KU/L
PECAN/HICK TREE IGE QN: 0.38
ROACH IGE QN: 0.8 KU/L
SALTWORT IGE QN: 0.19 KU/L
SHEEP SORREL IGE QN: <0.1 KU/L
SILVER BIRCH IGE QN: <0.1 KU/L
TIMOTHY IGE QN: 1.85 KU/L
TOTAL IGE SMQN RAST: 161 KU/L
WHITE ASH IGE QN: <0.1 KU/L
WHITE ELM IGE QN: 0.33 KU/L
WHITE MULBERRY IGE QN: <0.1
WHITE OAK IGE QN: <0.1 KU/L

## 2023-11-09 ENCOUNTER — HOSPITAL ENCOUNTER (OUTPATIENT)
Dept: RADIOLOGY | Facility: HOSPITAL | Age: 58
Discharge: HOME | End: 2023-11-09
Payer: COMMERCIAL

## 2023-11-09 ENCOUNTER — OFFICE VISIT (OUTPATIENT)
Dept: OPHTHALMOLOGY | Facility: CLINIC | Age: 58
End: 2023-11-09
Payer: COMMERCIAL

## 2023-11-09 DIAGNOSIS — D86.9 SARCOIDOSIS: Primary | ICD-10-CM

## 2023-11-09 DIAGNOSIS — D86.9 SARCOIDOSIS: ICD-10-CM

## 2023-11-09 PROCEDURE — 71046 X-RAY EXAM CHEST 2 VIEWS: CPT | Mod: FY

## 2023-11-09 PROCEDURE — 71046 X-RAY EXAM CHEST 2 VIEWS: CPT

## 2023-11-09 PROCEDURE — 71046 X-RAY EXAM CHEST 2 VIEWS: CPT | Performed by: RADIOLOGY

## 2023-11-09 ASSESSMENT — ENCOUNTER SYMPTOMS
ALLERGIC/IMMUNOLOGIC NEGATIVE: 0
ENDOCRINE NEGATIVE: 0
NEUROLOGICAL NEGATIVE: 0
MUSCULOSKELETAL NEGATIVE: 0
GASTROINTESTINAL NEGATIVE: 0
PSYCHIATRIC NEGATIVE: 0
EYES NEGATIVE: 0
HEMATOLOGIC/LYMPHATIC NEGATIVE: 0
RESPIRATORY NEGATIVE: 0
CONSTITUTIONAL NEGATIVE: 0
CARDIOVASCULAR NEGATIVE: 0

## 2023-11-09 ASSESSMENT — TONOMETRY
IOP_METHOD: GOLDMANN APPLANATION
OS_IOP_MMHG: 15
OD_IOP_MMHG: 14

## 2023-11-09 ASSESSMENT — VISUAL ACUITY
OD_CC: 20/30
CORRECTION_TYPE: GLASSES
OD_PH_CC+: -3
OS_CC+: -1
OS_CC: 20/20
METHOD: SNELLEN - LINEAR
OD_PH_CC: 20/20

## 2023-11-09 ASSESSMENT — SLIT LAMP EXAM - LIDS
COMMENTS: NORMAL
COMMENTS: NORMAL

## 2023-11-09 ASSESSMENT — EXTERNAL EXAM - RIGHT EYE: OD_EXAM: NORMAL

## 2023-11-09 ASSESSMENT — CUP TO DISC RATIO
OS_RATIO: 0.4
OD_RATIO: 0.4

## 2023-11-09 ASSESSMENT — EXTERNAL EXAM - LEFT EYE: OS_EXAM: NORMAL

## 2023-11-09 NOTE — PROGRESS NOTES
Subjective   Patient ID: Marian Zhou is a 58 y.o. female.  Chief Complaint    Sarcoidosis       58 y old with hx of SVT s/p ablation of accessory bypass tract, HTN, fibromyalgia, sleep apnea on CPAP, asthma, DM, obesity, DL, and mediastinal LN presenting for follow up after hospital stay where bronchoscopy showed evidence of non caseating granulomas.  Referred for ocular examination due to newly diagnosed stage 1 sarcoidosis.      HPI    Visual acuity (VA)is a little blurry both eyes (OU),,No floaters,No flashes,No eye pain,No eye drops being used.    Has noticed blurring more over the past month or so, mild. Comes and goes. No pain or discomfort. No photophobia, flashes, floaters.   Last edited by Home Garcia MD on 11/9/2023  2:08 PM.        No current outpatient medications on file. (Ophthalmology pharm classes)       Current Outpatient Medications (Other)   Medication Sig Dispense Refill    albuterol 2.5 mg /3 mL (0.083 %) nebulizer solution       albuterol 90 mcg/actuation inhaler Inhale 2 puffs.      amLODIPine (Norvasc) 10 mg tablet       atorvastatin (Lipitor) 20 mg tablet Take 1 tablet (20 mg) by mouth once daily.      celecoxib (CeleBREX) 200 mg capsule Take by mouth.      chlorzoxazone (Lorzone) 375 mg tablet       fluticasone (Flonase) 50 mcg/actuation nasal spray Administer 1 spray into each nostril once daily. Shake gently. Before first use, prime pump. After use, clean tip and replace cap. 16 g 1    gabapentin (Neurontin) 100 mg capsule Take 1 capsule (100 mg) by mouth 3 times a day.      hydroCHLOROthiazide (HYDRODiuril) 12.5 mg tablet Take by mouth.      hydroCHLOROthiazide (HYDRODiuril) 25 mg tablet       ipratropium-albuteroL (Duo-Neb) 0.5-2.5 mg/3 mL nebulizer solution       losartan (Cozaar) 100 mg tablet Take 1 tablet (100 mg) by mouth once daily.      losartan (Cozaar) 25 mg tablet Take 1 tablet (25 mg) by mouth once daily.      melatonin 10 mg tablet, sublingual Take 1 tablet by mouth  once daily at bedtime.      metFORMIN (Glucophage) 1,000 mg tablet Take 1 tablet (1,000 mg) by mouth.      metFORMIN (Glucophage) 500 mg tablet Take 1 tablet (500 mg) by mouth once daily.      methocarbamol (Robaxin) 500 mg tablet Take 1 tablet (500 mg) by mouth 3 times a day.      methylPREDNISolone (Medrol, Dominic,) 4 mg tablets Take by mouth.      metoprolol tartrate (Lopressor) 100 mg tablet Take 1 tablet (100 mg) by mouth 2 times a day.      montelukast (Singulair) 10 mg tablet       morphine (MSIR) 30 mg tablet Take by mouth.      phentermine (Adipex-P) 37.5 mg tablet 1 TABS ORAL DAILY FOR 28 DAYS      pregabalin (Lyrica) 150 mg capsule Take 1 capsule (150 mg) by mouth twice a day.      pregabalin (Lyrica) 200 mg capsule Take 1 capsule (200 mg) by mouth once daily.      pregabalin (Lyrica) 75 mg capsule Take 1 capsule (75 mg) by mouth twice a day.      Symbicort 160-4.5 mcg/actuation inhaler       topiramate (Topamax) 25 mg tablet Take 1 tablet (25 mg) by mouth once daily.      traMADol (Ultram) 50 mg tablet Take 1 tablet (50 mg) by mouth every 6 hours if needed.      triamterene-hydrochlorothiazid (Maxzide) 75-50 mg tablet       clobetasol (Temovate) 0.05 % cream Apply topically 2 times a day. apply sparingly to affected area      oxyCODONE (Roxicodone) 5 mg immediate release tablet ONE TAB BY MOUTH EVERY 6 HOURS IF NEEDED PAIN      oxyCODONE-acetaminophen (Percocet)  mg tablet Take 1 tablet by mouth.         Objective   Base Eye Exam       Visual Acuity (Snellen - Linear)         Right Left    Dist cc 20/30 20/20 -1    Dist ph cc 20/20 -3       Correction: Glasses              Tonometry (Goldmann Applanation, 1:45 PM)         Right Left    Pressure 14 15              Pupils         Pupils    Right PERRL, No APD    Left PERRL, No APD              Extraocular Movement         Right Left     Full Full              Neuro/Psych       Oriented x3: Yes    Mood/Affect: Normal              Dilation       Both  eyes: 1% Mydriacyl & 2.5% Constantino  @ 1:42 PM                  Slit Lamp and Fundus Exam       External Exam         Right Left    External Normal Normal              Slit Lamp Exam         Right Left    Lids/Lashes Normal Normal    Conjunctiva/Sclera White and quiet White and quiet    Cornea Clear Clear    Anterior Chamber Deep and quiet, no cells Deep and quiet, no cells    Iris Round and reactive Round and reactive    Lens tr NS tr NS    Anterior Vitreous Normal, no cell Normal, no cell              Fundus Exam         Right Left    Disc Normal Normal    C/D Ratio 0.4 0.4    Macula Normal Normal    Vessels Normal Normal    Periphery mild pigmentary changes, no lesions mild pigmentary changes, some supratemporal white without pressure, no lesions                  Imaging    Macula OCT 11/09/23  Right eye (OD): Normal contour and appearance, no IRF/subretinal fluid (SRF)  Left eye (OS): Normal contour and appearance, no IRF/subretinal fluid (SRF)      Assessment/Plan       58 y old with hx of SVT s/p ablation of accessory bypass tract, HTN, fibromyalgia, sleep apnea on CPAP, asthma, DM, obesity, DL, and mediastinal LN presenting for follow up after hospital stay where bronchoscopy showed evidence of non caseating granulomas.  Referred for ocular examination due to newly diagnosed stage 1 sarcoidosis.     #Sarcoidosis  -No evidence of intraocular inflammation or retinal lesions today  -Patient will get updated Mrx with regular optometrist  -Discussed worrisome symptoms with patient, know to come in right away with any new or worsening ocular symptoms, otherwise follow up 1 year

## 2023-11-15 ENCOUNTER — TELEPHONE (OUTPATIENT)
Dept: PULMONOLOGY | Facility: HOSPITAL | Age: 58
End: 2023-11-15
Payer: COMMERCIAL

## 2023-11-15 DIAGNOSIS — T78.40XA ALLERGY, INITIAL ENCOUNTER: ICD-10-CM

## 2023-11-15 DIAGNOSIS — J98.6 ELEVATED HEMIDIAPHRAGM: Primary | ICD-10-CM

## 2023-11-15 LAB
AFB CULTURE: NORMAL
AFB STAIN: NORMAL

## 2023-11-15 NOTE — TELEPHONE ENCOUNTER
Called patient and informed about allergy panel and PFT result and Xray  Xray was supposed to be done before next visit and is stable. Elevated Rt hemidiaphragm so will order FVC supine upright, and also Sniff test.   As for allergies will refer to allergy  Will also help patient with appointment to follow up within 6-8  weeks.     No hx of surgeries in chest, has had surgeries to thyroid, shoulder and knee.     Patient informed and given the numbers to call.

## 2023-11-25 DIAGNOSIS — R09.81 CONGESTION OF NASAL SINUS: ICD-10-CM

## 2023-11-27 ENCOUNTER — HOSPITAL ENCOUNTER (OUTPATIENT)
Dept: RESPIRATORY THERAPY | Facility: HOSPITAL | Age: 58
Discharge: HOME | End: 2023-11-27
Payer: COMMERCIAL

## 2023-11-27 DIAGNOSIS — J98.6 ELEVATED HEMIDIAPHRAGM: ICD-10-CM

## 2023-11-27 PROCEDURE — 94010 BREATHING CAPACITY TEST: CPT

## 2023-11-27 PROCEDURE — 94010 BREATHING CAPACITY TEST: CPT | Performed by: STUDENT IN AN ORGANIZED HEALTH CARE EDUCATION/TRAINING PROGRAM

## 2023-11-28 ENCOUNTER — APPOINTMENT (OUTPATIENT)
Dept: RADIOLOGY | Facility: HOSPITAL | Age: 58
End: 2023-11-28
Payer: COMMERCIAL

## 2023-11-28 LAB
MGC ASCENT PFT - FEV1 - POST: 1.51
MGC ASCENT PFT - FEV1 - PRE: 1.67
MGC ASCENT PFT - FEV1 - PREDICTED: 2.48
MGC ASCENT PFT - FVC - POST: 1.96
MGC ASCENT PFT - FVC - PRE: 2.04
MGC ASCENT PFT - FVC - PREDICTED: 3.08

## 2023-11-29 ENCOUNTER — OFFICE VISIT (OUTPATIENT)
Dept: CARDIOLOGY | Facility: CLINIC | Age: 58
End: 2023-11-29
Payer: COMMERCIAL

## 2023-11-29 VITALS
DIASTOLIC BLOOD PRESSURE: 70 MMHG | BODY MASS INDEX: 50.02 KG/M2 | HEIGHT: 64 IN | SYSTOLIC BLOOD PRESSURE: 130 MMHG | HEART RATE: 67 BPM | WEIGHT: 293 LBS | OXYGEN SATURATION: 97 %

## 2023-11-29 DIAGNOSIS — D86.9 SARCOIDOSIS: ICD-10-CM

## 2023-11-29 DIAGNOSIS — I10 BENIGN ESSENTIAL HYPERTENSION: ICD-10-CM

## 2023-11-29 DIAGNOSIS — R00.2 PALPITATIONS: ICD-10-CM

## 2023-11-29 DIAGNOSIS — Z98.890 S/P ABLATION OF ACCESSORY BYPASS TRACT: ICD-10-CM

## 2023-11-29 DIAGNOSIS — I47.10 SVT (SUPRAVENTRICULAR TACHYCARDIA) (CMS-HCC): ICD-10-CM

## 2023-11-29 DIAGNOSIS — R06.09 DOE (DYSPNEA ON EXERTION): Primary | ICD-10-CM

## 2023-11-29 PROCEDURE — 3075F SYST BP GE 130 - 139MM HG: CPT | Performed by: STUDENT IN AN ORGANIZED HEALTH CARE EDUCATION/TRAINING PROGRAM

## 2023-11-29 PROCEDURE — 3078F DIAST BP <80 MM HG: CPT | Performed by: STUDENT IN AN ORGANIZED HEALTH CARE EDUCATION/TRAINING PROGRAM

## 2023-11-29 PROCEDURE — 1036F TOBACCO NON-USER: CPT | Performed by: STUDENT IN AN ORGANIZED HEALTH CARE EDUCATION/TRAINING PROGRAM

## 2023-11-29 PROCEDURE — 99205 OFFICE O/P NEW HI 60 MIN: CPT | Performed by: STUDENT IN AN ORGANIZED HEALTH CARE EDUCATION/TRAINING PROGRAM

## 2023-11-29 RX ORDER — FLUTICASONE PROPIONATE 50 MCG
1 SPRAY, SUSPENSION (ML) NASAL DAILY
Qty: 48 ML | Refills: 1 | Status: SHIPPED | OUTPATIENT
Start: 2023-11-29 | End: 2024-05-27

## 2023-11-29 ASSESSMENT — ENCOUNTER SYMPTOMS
LOSS OF SENSATION IN FEET: 0
DEPRESSION: 0
OCCASIONAL FEELINGS OF UNSTEADINESS: 0

## 2023-11-29 ASSESSMENT — PAIN SCALES - GENERAL: PAINLEVEL: 0-NO PAIN

## 2023-11-29 NOTE — PATIENT INSTRUCTIONS
Please continue current cardiac medications.    We will obtain a transthoracic echocardiogram for structural evaluation including ejection fraction, assessment of regional wall motion abnormalities or valvular disease, and further evaluation of hemodynamics.    Please followup with me in Cardiology clinic within the next 4 months.  Please return to clinic sooner or seek emergent care if your symptoms reoccur or worsen.

## 2023-11-29 NOTE — PROGRESS NOTES
Referred by Dr. Ferreira for New Patient Visit (Referred by Dr Ferreira for sarcodosis)     HPI:    Marian Zhou is a 58 y.o. female with pertinent history of hypertension, palpitations, paroxysmal supraventricular tachycardia status post ablation of accessory bypass tract, granulomatosis lymphadenitis, osteoarthritis, obstructive sleep apnea, sarcoidosis involving the lungs presents to cardiology clinic to establish care.     She is doing relatively well.  Her recent hospital course was reviewed and discussed.  He is undergoing treatment of sarcoidosis we discussed possible cardiac manifestations.  Dyspnea on exertion is relatively stable.  She does note a viral-like illness over the last few weeks.  No exacerbating or relieving factors.  Patient denies chest pain and angina.  Pt denies orthopnea, and paroxysmal nocturnal dyspnea.  Pt denies worsening lower extremity edema.  Pt denies palpitations or syncope.  No recent falls.  No fever or chills.  No cough.  No change in bowel or bladder habits.  No sick contacts.  No recent travel.    12 point review of systems including (Constitutional, Eyes, ENMT, Respiratory, Cardiac, Gastrointestinal, Neurological, Psychiatric, and Hematologic) was performed and is otherwise negative.    Past medical history reviewed:   has a past medical history of Other intervertebral disc displacement, lumbar region, Personal history of other diseases of the musculoskeletal system and connective tissue, and Sciatica, left side.    Past surgical history reviewed:   has a past surgical history that includes Other surgical history (09/14/2020); Other surgical history (09/14/2020); and Other surgical history (09/14/2020).    Social history reviewed:   reports that she has never smoked. She has never used smokeless tobacco. She reports current alcohol use. She reports that she does not currently use drugs after having used the following drugs: Marijuana.     Family history reviewed:    Family  History   Problem Relation Name Age of Onset    Hypertension Mother          Essential    Diabetes Mother      Rheum arthritis Mother      Rheum arthritis Daughter         Allergies reviewed: Penicillins     Medications reviewed:   Current Outpatient Medications   Medication Instructions    albuterol 2.5 mg /3 mL (0.083 %) nebulizer solution     albuterol 90 mcg/actuation inhaler 2 puffs, inhalation    atorvastatin (LIPITOR) 20 mg, oral, Daily    celecoxib (CeleBREX) 200 mg capsule oral    chlorzoxazone (Lorzone) 375 mg tablet     fluticasone (Flonase) 50 mcg/actuation nasal spray 1 spray, Each Nostril, Daily, Shake gently. Before first use, prime pump. After use, clean tip and replace cap.    gabapentin (NEURONTIN) 100 mg, oral, 3 times daily    ipratropium-albuteroL (Duo-Neb) 0.5-2.5 mg/3 mL nebulizer solution     melatonin 10 mg tablet, sublingual 1 tablet, oral, Nightly    metFORMIN (GLUCOPHAGE) 500 mg, oral, Daily    metoprolol tartrate (LOPRESSOR) 100 mg, oral, 2 times daily    montelukast (Singulair) 10 mg tablet     phentermine (Adipex-P) 37.5 mg tablet 1 TABS ORAL DAILY FOR 28 DAYS    pregabalin (LYRICA) 200 mg, oral, Daily    Symbicort 160-4.5 mcg/actuation inhaler     traMADol (ULTRAM) 50 mg, oral, Every 6 hours PRN    triamterene-hydrochlorothiazid (Maxzide) 75-50 mg tablet         Vitals reviewed: Visit Vitals  /70   Pulse 67       Physical Exam:   General:  Patient is awake, alert, and oriented.  Patient is in no acute distress.  HEENT:  Pupils equal and reactive.  Normocephalic.  Moist mucosa.    Neck:  No thyromegaly.  Normal Jugular Venous Pressure.  Cardiovascular:  Regular rate and rhythm.  Normal S1 and S2.  1/6 MEET.  Pulmonary:  Clear to auscultation bilaterally.  Abdomen:  Soft. Non-tender.   Non-distended.  Positive bowel sounds.  Lower Extremities:  2+ pedal pulses. No LE edema.  Neurologic:  Cranial nerves intact.  No focal deficit.   Skin: Skin warm and dry, normal skin turgor.  "  Psychiatric: Normal affect.    Last Labs:  CBC -      Lab Results   Component Value Date    WBC 10.0 09/28/2023    HGB 10.5 (L) 09/28/2023    HCT 33.6 (L) 09/28/2023     09/28/2023        CMP-  Lab Results   Component Value Date    GLUCOSE 120 (H) 09/28/2023     09/28/2023    K 4.0 09/28/2023     09/28/2023    CO2 29 09/28/2023    ANIONGAP 13 09/28/2023    BUN 19 09/28/2023    CREATININE 1.05 09/28/2023    CALCIUM 9.0 09/28/2023    PHOS 5.0 (H) 09/28/2023    PROT 6.6 09/28/2023    ALBUMIN 3.8 09/28/2023    ALBUMIN 3.8 09/28/2023    AST 16 09/28/2023    ALT 29 09/28/2023    ALKPHOS 59 09/28/2023    BILITOT 0.4 09/28/2023        LIPIDS-  No results found for: \"CHOL\", \"TRIG\", \"HDL\", \"CHHDL\", \"VLDL\"     OTHERS-  Lab Results   Component Value Date    HGBA1C 7.1 (A) 09/26/2023    HGBA1C 7.1 (H) 06/21/2023    BNP 90 09/24/2023        I personally reviewed the patient's recent vitals, labs, medications, orders, EKGs, pertinent cardiac imaging.    Assessment and Plan:    Marian Zhou is a 58 y.o. female with pertinent history of hypertension, palpitations, paroxysmal supraventricular tachycardia status post ablation of accessory bypass tract, granulomatosis lymphadenitis, osteoarthritis, obstructive sleep apnea, sarcoidosis involving the lungs presents to cardiology clinic to establish care. She is doing relatively well.  Her recent hospital course was reviewed and discussed.  He is undergoing treatment of sarcoidosis we discussed possible cardiac manifestations.  Dyspnea on exertion is relatively stable.  She does note a viral-like illness over the last few weeks.      Please continue current cardiac medications.    We will obtain a transthoracic echocardiogram for structural evaluation including ejection fraction, assessment of regional wall motion abnormalities or valvular disease, and further evaluation of hemodynamics.    Please followup with me in Cardiology clinic within the next 4 " months.  Please return to clinic sooner or seek emergent care if your symptoms reoccur or worsen.    Thank you for allowing me to participate in their care.  Please feel free to call me with any further questions or concerns.        Jeremy Silva MD, FACC, SHANTEL DEE  Division of Cardiovascular Medicine  Medical Director, [facility]  [facility]  Assistant Clinical Professor, Medicine  [employer]  [email or web address]  Office:  [phone]

## 2023-11-30 ENCOUNTER — HOSPITAL ENCOUNTER (OUTPATIENT)
Dept: RADIOLOGY | Facility: HOSPITAL | Age: 58
Discharge: HOME | End: 2023-11-30
Payer: COMMERCIAL

## 2023-11-30 DIAGNOSIS — J98.6 ELEVATED HEMIDIAPHRAGM: ICD-10-CM

## 2023-11-30 PROCEDURE — 76000 FLUOROSCOPY <1 HR PHYS/QHP: CPT

## 2023-11-30 PROCEDURE — 71045 X-RAY EXAM CHEST 1 VIEW: CPT | Performed by: RADIOLOGY

## 2023-12-12 ENCOUNTER — APPOINTMENT (OUTPATIENT)
Dept: ALLERGY | Facility: HOSPITAL | Age: 58
End: 2023-12-12
Payer: COMMERCIAL

## 2023-12-28 ENCOUNTER — APPOINTMENT (OUTPATIENT)
Dept: PULMONOLOGY | Facility: HOSPITAL | Age: 58
End: 2023-12-28
Payer: COMMERCIAL

## 2024-01-10 ENCOUNTER — OFFICE VISIT (OUTPATIENT)
Dept: OBSTETRICS AND GYNECOLOGY | Facility: HOSPITAL | Age: 59
End: 2024-01-10
Payer: COMMERCIAL

## 2024-01-10 VITALS — DIASTOLIC BLOOD PRESSURE: 100 MMHG | SYSTOLIC BLOOD PRESSURE: 154 MMHG | WEIGHT: 293 LBS | BODY MASS INDEX: 51.15 KG/M2

## 2024-01-10 DIAGNOSIS — Z12.39 ENCOUNTER FOR SCREENING FOR MALIGNANT NEOPLASM OF BREAST, UNSPECIFIED SCREENING MODALITY: ICD-10-CM

## 2024-01-10 DIAGNOSIS — N89.8 OTHER SPECIFIED NONINFLAMMATORY DISORDERS OF VAGINA: ICD-10-CM

## 2024-01-10 DIAGNOSIS — Z12.4 SCREENING FOR MALIGNANT NEOPLASM OF CERVIX: Primary | ICD-10-CM

## 2024-01-10 DIAGNOSIS — Z11.3 ROUTINE SCREENING FOR STI (SEXUALLY TRANSMITTED INFECTION): ICD-10-CM

## 2024-01-10 DIAGNOSIS — N95.2 VAGINAL ATROPHY: ICD-10-CM

## 2024-01-10 DIAGNOSIS — Z12.31 ENCOUNTER FOR SCREENING MAMMOGRAM FOR MALIGNANT NEOPLASM OF BREAST: ICD-10-CM

## 2024-01-10 PROCEDURE — 99213 OFFICE O/P EST LOW 20 MIN: CPT | Performed by: OBSTETRICS & GYNECOLOGY

## 2024-01-10 PROCEDURE — 87624 HPV HI-RISK TYP POOLED RSLT: CPT | Performed by: OBSTETRICS & GYNECOLOGY

## 2024-01-10 PROCEDURE — 3077F SYST BP >= 140 MM HG: CPT | Performed by: OBSTETRICS & GYNECOLOGY

## 2024-01-10 PROCEDURE — 87661 TRICHOMONAS VAGINALIS AMPLIF: CPT | Mod: 59 | Performed by: OBSTETRICS & GYNECOLOGY

## 2024-01-10 PROCEDURE — 1036F TOBACCO NON-USER: CPT | Performed by: OBSTETRICS & GYNECOLOGY

## 2024-01-10 PROCEDURE — 87800 DETECT AGNT MULT DNA DIREC: CPT | Performed by: OBSTETRICS & GYNECOLOGY

## 2024-01-10 PROCEDURE — 88175 CYTOPATH C/V AUTO FLUID REDO: CPT | Mod: TC | Performed by: OBSTETRICS & GYNECOLOGY

## 2024-01-10 PROCEDURE — 3080F DIAST BP >= 90 MM HG: CPT | Performed by: OBSTETRICS & GYNECOLOGY

## 2024-01-10 RX ORDER — ESTRADIOL 0.1 MG/G
2 CREAM VAGINAL NIGHTLY
Qty: 34 G | Refills: 3 | Status: SHIPPED | OUTPATIENT
Start: 2024-01-10 | End: 2025-01-09

## 2024-01-10 ASSESSMENT — ENCOUNTER SYMPTOMS
DEPRESSION: 0
LOSS OF SENSATION IN FEET: 0
OCCASIONAL FEELINGS OF UNSTEADINESS: 0

## 2024-01-10 ASSESSMENT — PAIN SCALES - GENERAL: PAINLEVEL: 0-NO PAIN

## 2024-01-10 NOTE — PROGRESS NOTES
Subjective   Patient ID: Marian Zhou is a 58 y.o. female who presents for STI Screening (Patient here for vaginal discomfort/Patient reports pain during sex/).  Pt has painful intercourse     LMP  age 56   Pt has vaginal burning     Pt wants STI testing and is interested in vaginal estrogen         Review of Systems   Genitourinary:  Positive for vaginal pain.       Objective   Physical Exam  Constitutional:       Appearance: She is obese.   Pulmonary:      Effort: Pulmonary effort is normal.   Genitourinary:     General: Normal vulva.      Vagina: Normal.      Cervix: Normal.      Uterus: Normal.       Adnexa: Right adnexa normal and left adnexa normal.      Rectum: Normal.      Comments: Atrophy present   Skin:     General: Skin is warm.   Neurological:      Mental Status: She is alert.         Assessment/Plan   Diagnoses and all orders for this visit:  Screening for malignant neoplasm of cervix  -     THINPREP PAP TEST  Vaginal atrophy  -     estradiol (Estrace) 0.01 % (0.1 mg/gram) vaginal cream; Insert 0.5 Applicatorfuls (2 g) into the vagina once daily at bedtime. At bedtime for 2 weeks, then at bedtime twice a week.  Encounter for screening for malignant neoplasm of breast, unspecified screening modality  -     BI mammo bilateral screening tomosynthesis; Future  Routine screening for STI (sexually transmitted infection)  -     Trichomonas vaginalis, Amplified  -     C. trachomatis + N. gonorrhoeae, Amplified  Encounter for screening mammogram for malignant neoplasm of breast  -     BI mammo bilateral screening tomosynthesis; Future  Other specified noninflammatory disorders of vagina  -     C. trachomatis + N. gonorrhoeae, Amplified       Follow up in 12 months     Magalis Scott MD 01/10/24 2:31 PM

## 2024-01-11 LAB
C TRACH RRNA SPEC QL NAA+PROBE: NEGATIVE
N GONORRHOEA DNA SPEC QL PROBE+SIG AMP: NEGATIVE
T VAGINALIS RRNA SPEC QL NAA+PROBE: NEGATIVE

## 2024-01-18 ENCOUNTER — OFFICE VISIT (OUTPATIENT)
Dept: PULMONOLOGY | Facility: HOSPITAL | Age: 59
End: 2024-01-18
Payer: COMMERCIAL

## 2024-01-18 VITALS
DIASTOLIC BLOOD PRESSURE: 80 MMHG | OXYGEN SATURATION: 97 % | TEMPERATURE: 96.6 F | SYSTOLIC BLOOD PRESSURE: 116 MMHG | BODY MASS INDEX: 51.17 KG/M2 | WEIGHT: 293 LBS | HEART RATE: 67 BPM

## 2024-01-18 DIAGNOSIS — E66.01 CLASS 3 SEVERE OBESITY WITH BODY MASS INDEX (BMI) OF 50.0 TO 59.9 IN ADULT, UNSPECIFIED OBESITY TYPE, UNSPECIFIED WHETHER SERIOUS COMORBIDITY PRESENT (MULTI): ICD-10-CM

## 2024-01-18 DIAGNOSIS — I88.8 GRANULOMATOUS LYMPHADENITIS: Primary | ICD-10-CM

## 2024-01-18 DIAGNOSIS — J45.40 MODERATE PERSISTENT ASTHMA, UNSPECIFIED WHETHER COMPLICATED (HHS-HCC): ICD-10-CM

## 2024-01-18 DIAGNOSIS — J98.6 ELEVATED HEMIDIAPHRAGM: ICD-10-CM

## 2024-01-18 DIAGNOSIS — D86.0 SARCOIDOSIS OF LUNG (MULTI): ICD-10-CM

## 2024-01-18 PROCEDURE — 1036F TOBACCO NON-USER: CPT | Performed by: STUDENT IN AN ORGANIZED HEALTH CARE EDUCATION/TRAINING PROGRAM

## 2024-01-18 PROCEDURE — 3074F SYST BP LT 130 MM HG: CPT | Performed by: STUDENT IN AN ORGANIZED HEALTH CARE EDUCATION/TRAINING PROGRAM

## 2024-01-18 PROCEDURE — 3079F DIAST BP 80-89 MM HG: CPT | Performed by: STUDENT IN AN ORGANIZED HEALTH CARE EDUCATION/TRAINING PROGRAM

## 2024-01-18 PROCEDURE — 99214 OFFICE O/P EST MOD 30 MIN: CPT | Performed by: STUDENT IN AN ORGANIZED HEALTH CARE EDUCATION/TRAINING PROGRAM

## 2024-01-18 PROCEDURE — 3008F BODY MASS INDEX DOCD: CPT | Performed by: STUDENT IN AN ORGANIZED HEALTH CARE EDUCATION/TRAINING PROGRAM

## 2024-01-18 PROCEDURE — 99214 OFFICE O/P EST MOD 30 MIN: CPT | Mod: GC | Performed by: STUDENT IN AN ORGANIZED HEALTH CARE EDUCATION/TRAINING PROGRAM

## 2024-01-18 ASSESSMENT — PATIENT HEALTH QUESTIONNAIRE - PHQ9
2. FEELING DOWN, DEPRESSED OR HOPELESS: NOT AT ALL
1. LITTLE INTEREST OR PLEASURE IN DOING THINGS: NOT AT ALL
SUM OF ALL RESPONSES TO PHQ9 QUESTIONS 1 & 2: 0

## 2024-01-18 ASSESSMENT — PAIN SCALES - GENERAL: PAINLEVEL: 8
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
## 2024-01-19 PROBLEM — J45.40 MODERATE PERSISTENT ASTHMA (HHS-HCC): Status: ACTIVE | Noted: 2024-01-19

## 2024-01-19 PROBLEM — D86.0 SARCOIDOSIS OF LUNG (MULTI): Status: ACTIVE | Noted: 2024-01-19

## 2024-01-19 PROBLEM — J98.6 ELEVATED HEMIDIAPHRAGM: Status: ACTIVE | Noted: 2024-01-19

## 2024-01-23 ENCOUNTER — APPOINTMENT (OUTPATIENT)
Dept: OBSTETRICS AND GYNECOLOGY | Facility: HOSPITAL | Age: 59
End: 2024-01-23
Payer: COMMERCIAL

## 2024-03-05 ENCOUNTER — ANCILLARY PROCEDURE (OUTPATIENT)
Dept: CARDIOLOGY | Facility: CLINIC | Age: 59
End: 2024-03-05
Payer: COMMERCIAL

## 2024-03-05 VITALS — BODY MASS INDEX: 50.02 KG/M2 | HEIGHT: 64 IN | WEIGHT: 293 LBS

## 2024-03-05 DIAGNOSIS — D86.9 SARCOIDOSIS: ICD-10-CM

## 2024-03-05 DIAGNOSIS — R06.09 DOE (DYSPNEA ON EXERTION): ICD-10-CM

## 2024-03-05 DIAGNOSIS — Z98.890 S/P ABLATION OF ACCESSORY BYPASS TRACT: ICD-10-CM

## 2024-03-05 DIAGNOSIS — R00.2 PALPITATIONS: ICD-10-CM

## 2024-03-05 DIAGNOSIS — I47.10 SVT (SUPRAVENTRICULAR TACHYCARDIA) (CMS-HCC): ICD-10-CM

## 2024-03-05 DIAGNOSIS — I10 BENIGN ESSENTIAL HYPERTENSION: ICD-10-CM

## 2024-03-05 LAB
AORTIC VALVE PEAK VELOCITY: 1.87 M/S
AV PEAK GRADIENT: 14 MMHG
AVA (PEAK VEL): 1.95 CM2
EJECTION FRACTION APICAL 4 CHAMBER: 70.4
EJECTION FRACTION: 61 %
LEFT ATRIUM VOLUME AREA LENGTH INDEX BSA: 32.3 ML/M2
LEFT VENTRICLE INTERNAL DIMENSION DIASTOLE: 4.5 CM (ref 3.5–6)
LEFT VENTRICULAR OUTFLOW TRACT DIAMETER: 2.01 CM
MITRAL VALVE E/A RATIO: 1.01
MITRAL VALVE E/E' RATIO: 7.84
RIGHT VENTRICLE FREE WALL PEAK S': 16 CM/S
RIGHT VENTRICLE PEAK SYSTOLIC PRESSURE: 23.8 MMHG

## 2024-03-05 PROCEDURE — 2500000004 HC RX 250 GENERAL PHARMACY W/ HCPCS (ALT 636 FOR OP/ED): Performed by: STUDENT IN AN ORGANIZED HEALTH CARE EDUCATION/TRAINING PROGRAM

## 2024-03-05 PROCEDURE — 93306 TTE W/DOPPLER COMPLETE: CPT

## 2024-03-05 PROCEDURE — 93306 TTE W/DOPPLER COMPLETE: CPT | Performed by: STUDENT IN AN ORGANIZED HEALTH CARE EDUCATION/TRAINING PROGRAM

## 2024-03-05 RX ADMIN — PERFLUTREN 2 ML OF DILUTION: 6.52 INJECTION, SUSPENSION INTRAVENOUS at 10:49

## 2024-03-06 NOTE — CONSULTS
Service:   Service: Pulmonology     Consult:  Consult requested by (Attending Name): Tanya Sin   Reason: mediastinal mass     History of Present Illness:   HPI:    FELIPA ROY is a 58 year old Female w/ PMHx significant for chart diagnosis of asthma (patient reports having PFTs ~4 years ago, no current access to those),  JAVI (not on CPAP last 3 months), HTN, HLD, DMII, osteoarthritis w/ L shoulder replacement and bilateral knee replacements who presented to the ED w/ ~2 wks of increasing SOB that did not improve w/ her home albuterol and symbicort. The patient states  that she doesn't regularly take her inhalers, but she started taking them when she was more SOB without improvement with her symptoms. She does endorse 2 pillow orthopnea as well as night sweats over the same time period. She denies any recent unintentional  weight loss, sick contacts, vision change, skin changes/rash. She does endorse easy bruising subjectively, but denies easy bleeding. The patient does have history of pneumonia in either 2019 or 2020 and was treated w/ abx for this, but no more recent  history. Patient does not wear supplemental oxygen at home.    Patient denies personal history of any malignancy. She did have a mammogram earlier this year which resulted in her getting a biopsy of the R breast, with pathology showing secretory changes with associated calcifications and ectatic ducts; recommendation  was for yearly mammo. Colonoscopy in 2019 showed A 1 cm benign lipoma in the transverse colon, biopsied, otherwise normal colonoscopy. Recommendation was for repeat in 5-10 yrs. Denies family history of sarcoidosis. Denies smoking history. Does have grandson  w/ asthma.     Review Family/Social History and ROS:   Social History:    Smoking Status: never smoker  (1)   Alcohol Use: occasionally (1)   Drug Use: denies  (1)            Allergies:  ·  penicillin : Hives/Urticaria    Objective:     Objective Information:         T   P  R  BP   MAP  SpO2   Value  36.4  66  22  154/95   117  95%  Date/Time 9/26 12:45 9/26 12:45 9/26 12:45 9/26 12:45  9/26 8:03 9/26 12:45  Range  (36.4C - 36.7C )  (57 - 77 )  (16 - 28 )  (154 - 187 )/ (74 - 116 )  (115 - 127 )  (91% - 99% )    Physical Exam by System:    Constitutional: Well developed, awake/alert/oriented  x3, no distress, alert and cooperative. Mildly dyspneic on exam while standing and talking.   Eyes: PERRL, EOMI, clear sclera   Respiratory/Thorax: Patent airways, CTAB, normal  breath sounds with good chest expansion, thorax symmetric   Cardiovascular: Regular, rate and rhythm, no murmurs,  2+ equal pulses of the extremities, normal S 1and S 2   Gastrointestinal: Nondistended, soft, non-tender,  no rebound tenderness or guarding, no masses palpable, no organomegaly, +BS, no bruits   Extremities: normal extremities, no cyanosis edema,  contusions or wounds, no clubbing. Decreased ROM in LUE, strength limited 2/2 pain   Skin: Warm and dry, no lesions, no rashes       Assessment:    FELIPA ROY is a 58 year old Female w/ PMHx significant for chart diagnosis of asthma (patient reports having PFTs ~4 years ago, no current access to those),  JAVI (not on CPAP last 3 months), HTN, HLD, DMII, osteoarthritis w/ L shoulder replacement and bilateral knee replacements who presents w/ SOB and found to have diffuse intrathoracic mediastinal lymphadenopathy. Per HIE review of previous CT Scan reports  (not able to look at images, reports only available), there is no previous mention of adenopathy on previous scans. Given the patient's clinical picture, less likely to be infectious.    The patient's symptoms do seem consistent with asthma (symptoms of wheezing and shortness of breath with exposures either to dust or environmental) despite lack of PFTs to review. Her symptoms do suggest some hyperactive airway disease. That being said,  with regard to her acute illness, it is hard to tell if  this is related to her mediastinal lymphadenopathy. Her adenopathy could be reactive, sarcoidosis related, infectious, less likely to be malignant. Will plan for EBUS w/ biopsy of lymph nodes, just  dependent on timing and if patient is discharged soon.     Recommendations:  - CPAP at night with autotitration (patient on CPAP at home)  - Continue prednisone course for total of 5 days   - Will plan for add-on bronchoscopy tomorrow 9/26; if procedure is not able to be completed, can defer to outpatient   - Please make NPO at midnight, obtain coag screen, and active type and screen   - Outpatient follow up with pulmonology (we will arrange)    Thank you for this consult, pulm will continue to follow. If any questions arise, please reach out to pulm [phone].    Patient seen, examined, and discussed with Dr. Teran.     Tonio Jauregui MD  PGY-1 Internal Medicine    Consult Status:  Consult Status    (select all that apply): initial  consult complete, will follow   Consult Order ID: [id number]     Attestation:   Note Completion:  I am a:  Resident/Fellow   Attending Attestation I saw and evaluated the patient.  I personally obtained the key and critical portions of the history and physical exam or was physically present for key and  critical portions performed by the resident/fellow. I reviewed the resident/fellow?s documentation and discussed the patient with the resident/fellow.  I agree with the resident/fellow?s medical decision making as documented in the note.     I personally evaluated the patient on 26-Sep-2023         Electronic Signatures:  Tonio Jauregui (Resident))  (Signed 26-Sep-2023 17:41)   Authored: Service, History of Present Illness, Review  Family/Social History and ROS, Allergies, Objective, Assessment/Recommendations, Note Completion  Ernesto Teran)  (Signed 29-Sep-2023 21:08)   Authored: Assessment/Recommendations, Note Completion   Co-Signer: History of Present Illness, Assessment/Recommendations,  "Note Completion      Last Updated: 29-Sep-2023 21:08 by Ernesto Teran)    References:  1.  Data Referenced From \"Patient Profile - Adult v2\" 25-Sep-2023 21:32   "

## 2024-03-22 ENCOUNTER — HOSPITAL ENCOUNTER (OUTPATIENT)
Dept: RADIOLOGY | Facility: HOSPITAL | Age: 59
Discharge: HOME | End: 2024-03-22
Payer: COMMERCIAL

## 2024-03-22 VITALS — BODY MASS INDEX: 50.02 KG/M2 | HEIGHT: 64 IN | WEIGHT: 293 LBS

## 2024-03-22 DIAGNOSIS — Z12.31 ENCOUNTER FOR SCREENING MAMMOGRAM FOR MALIGNANT NEOPLASM OF BREAST: ICD-10-CM

## 2024-03-22 DIAGNOSIS — Z12.39 ENCOUNTER FOR SCREENING FOR MALIGNANT NEOPLASM OF BREAST, UNSPECIFIED SCREENING MODALITY: ICD-10-CM

## 2024-03-22 PROCEDURE — 77067 SCR MAMMO BI INCL CAD: CPT | Performed by: STUDENT IN AN ORGANIZED HEALTH CARE EDUCATION/TRAINING PROGRAM

## 2024-03-22 PROCEDURE — 77067 SCR MAMMO BI INCL CAD: CPT

## 2024-03-22 PROCEDURE — 77063 BREAST TOMOSYNTHESIS BI: CPT | Performed by: STUDENT IN AN ORGANIZED HEALTH CARE EDUCATION/TRAINING PROGRAM

## 2024-03-25 ENCOUNTER — APPOINTMENT (OUTPATIENT)
Dept: CARDIOLOGY | Facility: CLINIC | Age: 59
End: 2024-03-25
Payer: COMMERCIAL

## 2024-03-28 ENCOUNTER — OFFICE VISIT (OUTPATIENT)
Dept: PULMONOLOGY | Facility: HOSPITAL | Age: 59
End: 2024-03-28
Payer: COMMERCIAL

## 2024-03-28 VITALS
WEIGHT: 293 LBS | SYSTOLIC BLOOD PRESSURE: 133 MMHG | OXYGEN SATURATION: 95 % | HEART RATE: 67 BPM | DIASTOLIC BLOOD PRESSURE: 83 MMHG | TEMPERATURE: 94.9 F | BODY MASS INDEX: 51.84 KG/M2

## 2024-03-28 DIAGNOSIS — G47.30 SLEEP APNEA, UNSPECIFIED TYPE: ICD-10-CM

## 2024-03-28 DIAGNOSIS — J45.40 MODERATE PERSISTENT ASTHMA, UNSPECIFIED WHETHER COMPLICATED (HHS-HCC): ICD-10-CM

## 2024-03-28 DIAGNOSIS — D86.0 SARCOIDOSIS OF LUNG (MULTI): Primary | ICD-10-CM

## 2024-03-28 RX ORDER — BUDESONIDE AND FORMOTEROL FUMARATE DIHYDRATE 160; 4.5 UG/1; UG/1
2 AEROSOL RESPIRATORY (INHALATION)
Qty: 10.2 G | Refills: 3 | Status: SHIPPED | OUTPATIENT
Start: 2024-03-28

## 2024-03-28 RX ORDER — ALBUTEROL SULFATE 90 UG/1
2 AEROSOL, METERED RESPIRATORY (INHALATION) EVERY 6 HOURS PRN
Qty: 18 G | Refills: 3 | Status: SHIPPED | OUTPATIENT
Start: 2024-03-28

## 2024-03-28 ASSESSMENT — PAIN SCALES - GENERAL: PAINLEVEL: 0-NO PAIN

## 2024-03-28 NOTE — PATIENT INSTRUCTIONS
[place] Zhou Thank you for visiting the Pulmonary clinic today! We discussed the followin) Asthma:  Please continue Symbicort 2 puffs twice daily and wash mouth after and spit out   Please continue albuterol as needed every 6-8 hours   Please schedule pulmonary rehab    2) Sarcoidosis:  Please schedule PFT and will communicate with you about the results   Please schedule walking test   Please follow up in 3 months in clinic            For scheduling purposes:     Call [phone] to schedule a breathing or a walking test      Call [phone] to schedule  EKG's, Echocardiograms and Cardiopulmonary Stress Tests.     Call [phone] to schedule Radiology tests such as Nuclear Medicine Stress Tests, CT Scans, and MRI's.     Should you have any questions Please Call my assistant Jeremiah Rodriguez at  or our pulmonary nurse Kate Akbar at [phone]

## 2024-03-28 NOTE — PROGRESS NOTES
Department of Medicine  Division of Pulmonary, Critical Care, and Sleep Medicine  Follow-Up Visit  [address] [facility]    Background: Sarcoidosis and asthma (last seen on 1/18/2024), Rt diaphragmatic weakness, JAVI    Physician HPI (3/29/2024):  58 y old with hx of SVT s/p ablation of accessory bypass tract, HTN, fibromyalgia, sleep apnea on CPAP, asthma, DM, obesity, DL, asthma, elevated right hemidiaphragm, pleural thickening, and mediastinal LN presenting for follow up after hospital stay 9/25-9/28/2023 where bronchoscopy showed evidence of non caseating granulomas.     Hospital stay from 9/25-9/28: presenting for a 1-2 week history of shortness of breath attributed  to an asthma exacerbation admitted for further evaluation of mediastinal mass found incidentally on CT. Upon arrival to ED patient was afebrile and hypertensive to 205/97 but non-tachycardic and saturating 97% RA. CBC, RFP, LFT, troponin x2, and BNP unremarkable. D-dimer elevated to 1149. CTPE negative for PE but did demonstrate extensive intrathoracic  lymphadenopathy as well as 3.6 x 3.4 x 4.0 cm soft tissue density within the mediastinum. Patient received Duonebs, albuterol nebulizer, and 40 mg PO prednisone in the ED and was admitted to CDU for monitoring of asthma symptoms. Despite Duonebs, budesonide,  albuterol, and PO prednisone, patient's shortness of breath did not subjectively improve so she was ultimately admitted for further evaluation of mediastinal mass.  Pulmonology saw the patient who recommended EBUS. Recommended 5 day course  of PO prednisone (9/24-9/28). . Rapid On-Site Evaluation preliminary report suggestive of granulomatous tissue in node level 4R, 7, and 11Rs with final report pending. No atypical  lymphocytes seen but there was granulomatous inflammation noted. BAL studies showing WBC 58 (71% mononuclear cells, 25% lymphocytes, 3% neutrophils, 1% eosinophils). Per pulmonology, highest suspicion for  granulomatous lymphadenitis. Multiple lymph nodes show non-caseating granulomas, CD4:8 on the BAL is 8.67.candida in culture in BAL. But AFB and fungal stain neg. No airway inflammation      Pulmonary Recommendations inpatinet. Only 5 days or prednisone/ Follow up with pulm outpatient in 2-4 weeks with PFTs/ Scheduled symbicort 2 puffs twice daily /Albuterol rescue inhaler PRN./ if this is sarcoidosis and this is her first occurrence, there is likelihood that this could resolve without treatment; continue to evaluate outpatient      11/2/2023: improving sx and had a cold gave her some set back     1/19/2024: ACT score 21. Sx improving. Feels like her asthma is under control.     3/28/2024: ACT score 18  Patient mentions intermittent SOB sensation 4-5 times in last two weeks. Denies SOB on rest. Had some cough episodes with the SOB. Possibly missed symbicort on those days which she has missed it almost 3 x per week. Mentions weight gain. Mentions ongoing night sweat, denies fever or chills. No heart burn, or sinus issues. Feels fatigue. Denies needing rescue inhaler. No recent visits to ED or use of prednisone. No leg swelling. Following with cards. Following with ophthalmology, new cataract and change in glasses. Mentions that symbicort causes jitters if she uses it late and just before she sleeps, if take it earlier no problem. Sleeps with mouth open. No chest pain or palpitations. Mentions trouble with her shoulder. 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  [place]   No asbestos exposure she is aware of  Ppd neg     Immunization History:  Immunization History   Administered Date(s) Administered    Hep A / Hep B 12/01/2015    Influenza, seasonal, injectable 12/01/2015    Pfizer Purple Cap SARS-CoV-2 08/16/2021, 09/06/2021    Tdap vaccine,  age 7 year and older (BOOSTRIX, ADACEL) 12/01/2015       Current Medications:  Current Outpatient Medications   Medication Instructions    albuterol 2.5 mg /3 mL (0.083 %) nebulizer solution     albuterol 90 mcg/actuation inhaler 2 puffs, inhalation, Every 6 hours PRN    atorvastatin (LIPITOR) 20 mg, oral, Daily    celecoxib (CeleBREX) 200 mg capsule oral    chlorzoxazone (Lorzone) 375 mg tablet     estradiol (ESTRACE) 2 g, vaginal, Nightly, At bedtime for 2 weeks, then at bedtime twice a week.    fluticasone (Flonase) 50 mcg/actuation nasal spray 1 spray, Each Nostril, Daily, Shake gently. Before first use, prime pump. After use, clean tip and replace cap.    gabapentin (NEURONTIN) 100 mg, oral, 3 times daily    ipratropium-albuteroL (Duo-Neb) 0.5-2.5 mg/3 mL nebulizer solution     melatonin 10 mg tablet, sublingual 1 tablet, oral, Nightly    metFORMIN (GLUCOPHAGE) 500 mg, oral, Daily    metoprolol tartrate (LOPRESSOR) 100 mg, oral, 2 times daily    montelukast (Singulair) 10 mg tablet     phentermine (Adipex-P) 37.5 mg tablet 1 TABS ORAL DAILY FOR 28 DAYS    pregabalin (LYRICA) 200 mg, oral, Daily    Symbicort 160-4.5 mcg/actuation inhaler 2 puffs, inhalation, 2 times daily RT, Rinse mouth with water after use to reduce aftertaste and incidence of candidiasis. Do not swallow.    traMADol (ULTRAM) 50 mg, oral, Every 6 hours PRN    triamterene-hydrochlorothiazid (Maxzide) 75-50 mg tablet         Drug Allergies/Intolerances:  Allergies   Allergen Reactions    Penicillins Hives        Physical Examination:  /83   Pulse 67   Temp 34.9 °C (94.9 °F)   Wt 137 kg (302 lb)   LMP  (LMP Unknown)   SpO2 95%   BMI 51.84 kg/m²      General: ambulated independently; no acute distress; ; work of breathing was not increased;  Chest: no wheezing   Cardiac: RRR  Extremities: trace leg edema;  Psychiatric: did not appear depressed or anxious.    Pulmonary Function Test Results       PFT in 11/2023 Ratio normal, FVC 70%  1.98L,  , neg post dilator, TLC normal at 84% DLCO 65% but corrected DLCO/VA normal      FENO 14 on 11/7/2023     PFT supine after in 11/27/2023: The FEV1 and FVC are both reduced. There is no significant difference in FEV1 or FVC in sitting (pre) and supine (post) position.      Sniff test IMPRESSION:  1. Right hemidiaphragm elevation with decreased diaphragm excursion.    Chest Radiograph     XR chest 2 views 11/09/2023    Narrative  Interpreted By:  Mayito Vigil,  STUDY:  XR CHEST 2 VIEWS;  11/9/2023 2:47 pm    INDICATION:  Signs/Symptoms:Follow up mediastinal LN.    COMPARISON:  Radiograph and CT 09/24/2023.    ACCESSION NUMBER(S):  [id number]    ORDERING CLINICIAN:  ZOHAIB ONTIVEROS    FINDINGS:  Heart size is within upper limits of normal. Thoracic aortic  tortuosity is similar to previous.    Right hemidiaphragmatic elevation is again noted. Bilateral hilar  prominence probably represents, at least partially, the hilar  adenopathy demonstrated on the CT of 09/24/2023. Additional right  perihilar density probably represents adjacent right middle and lower  lobe atelectasis.    Remaining lung fields are otherwise clear. There is no other evidence  of significant pleural fluid or pneumothorax.    Left shoulder prosthesis is again noted.    Impression  1.  Elevation right hemidiaphragm.  2. Hilar lymphadenopathy.  3. Right middle and lower lobe atelectasis.  4. The appearance of the above is similar to that on previous recent  imaging.        MACRO:  None    Signed by: Mayito Vigil 11/9/2023 3:00 PM  Dictation workstation:   [id number]      Echocardiogram     TTE: 3/5/2024     CONCLUSIONS:   1. Left ventricular systolic function is normal with a 60-65% estimated ejection fraction.       Chest CT Scan     CT angio chest for pulmonary embolism 09/24/2023    Narrative  Interpreted By:  VIJAY CARRILLO MD and ERNESTINA LAMB MD  MRN: [id number]  Patient Name: FELIPA ROY    STUDY:  CT ANGIO CHEST FOR PE;   9/24/2023 1:18 pm    INDICATION:  r/o PE, Lie Flat: Yes .    COMPARISON:  Chest radiograph dated 09/24/2023.    ACCESSION NUMBER(S):  [id number]    ORDERING CLINICIAN:  JC MELENDEZ    TECHNIQUE:  Helical data acquisition of the chest was obtained after intravenous  administration of 80 milliliterOMNIPAQUE 350, as per PE protocol.  Images were reformatted in coronal and sagittal planes. Axial and  coronal maximum intensity projection (MIP) images were created and  reviewed.  In addition, dual energy reconstructions were also performed  including low energy virtual mono-energetic images and iodine density  maps.    FINDINGS:  POTENTIAL LIMITATIONS OF THE STUDY: The assessment is limited by  respiratory motion and suboptimal contrast opacification of pulmonary  arteries.    HEART AND VESSELS:  No discrete filling defects within the main pulmonary artery or its  branches to  proximal segmental level. Please note that, assessment  of distal segmental and subsegmental branches is limited and small  peripheral emboli are not entirely excluded.  Main pulmonary artery is prominent at 2.8 cm.    The thoracic aorta normal in course and caliber.There is moderate  atherosclerosis present, including calcified and noncalcified  plaques.Although, the study is not tailored for evaluation of aorta,  there is no definite evidence of acute aortic pathology.  Moderate coronary artery calcifications are seen. Please note,the  study is not optimized for evaluation of coronary arteries.  The mild cardiomegaly.  There are somewhat prominent aortic valvular calcifications seen, and  correlate with concern for aortic stenosis.  There is no pericardial effusion seen.    MEDIASTINUM AND SIDDHARTH, LOWER NECK AND AXILLA:  The visualized thyroid gland is not included within the field of view.  There is extensive intrathoracic lymphadenopathy,  includingmediastinal and bilateral perihilar lymphadenopathy.  Representative examples include a 1.4 cm  subaortic node (series 401,  image 64). Additionally, there is a large 3.6 x 3.4 cm x 4.0 cm soft  tissue density within the mediastinum (series 401, image 61 and  series 403, images 121). This likely represents a conglomerate of  right paratracheal nodes, however unable to exclude a soft tissue  mass. An underlying neoplastic process/lymphoproliferative disorder  is not excluded. Esophagus appears within normal limits as seen.    LUNGS AND AIRWAYS:  The trachea and central airways are patent. No endobronchial lesion  is seen. There is mild diffuse bronchial wall thickening, which is a  non-specific finding, but may be due to chronic bronchitis/asthma.  There is evidence of bilateral pleural thickening,  left-greater-than-right, measuring up to 0.7 cm on the left (series  402, image 189).  There is smooth interlobular septal thickening, suggestive of  interstitial pulmonary edema. There is a diffuse increase in lung  attenuation with elevation of the bilateral diaphragm (most severe on  the right) and decreased cross-sectional lung area, which may be the  result of poor inspiratory effort. There is no evidence of  pneumothorax.    UPPER ABDOMEN:  Diffusely decreased attenuation of the liver parenchyma, consistent  with hepatic steatosis.    CHEST WALL AND OSSEOUS STRUCTURES:  Incidental note is made of a small amount of venous collaterals  within the anterior chest wall. There is no evidence of venous  thrombosis within the visualized venous structures, however this is  somewhat limited due to beam hardening artifact from contrast bolus  within the left subclavian vein. No acute osseous pathology.There are  no suspicious osseous lesions.Mild multilevel degenerative changes  within visualized spine.    Impression  1. No evidence of acute pulmonary embolism. Within the main pulmonary  artery or its branches to  proximal segmental level. Please note  that, assessment of distal segmental and subsegmental branches  is  limited and small peripheral emboli are not entirely excluded. The  main pulmonary artery is prominent at 2.8 cm.  2. There is extensive intrathoracic lymphadenopathy,  includingmediastinal and bilateral perihilar lymphadenopathy.  Additionally, there is a large 3.6 x 3.4 cm x 4.0 cm soft tissue  density within the mediastinum. This likely represents a conglomerate  of right paratracheal nodes, however unable to exclude a soft tissue  mass. Lastly, there is bilateral pleural thickening,  left-greater-than-right. An underlying neoplastic  process/lymphoproliferative disorder is not excluded. Oncology  consult is recommended and PET-CT can be obtained as clinically  warranted.  3. Incidental note is made of a small amount of venous collaterals  within the anterior chest wall. There is no evidence of venous  thrombosis within the visualized venous structures, however this is  somewhat limited due to beam hardening artifact from contrast bolus  within the left subclavian vein.    I personally reviewed the images/study and I agree with the findings  as stated. This study was interpreted at [facility], [place], [place].    MACRO:  None       Laboratory Studies    Metabolic Parameters     Sodium   Date/Time Value Ref Range Status   09/28/2023 05:00  136 - 145 mmol/L Final     Potassium   Date/Time Value Ref Range Status   09/28/2023 05:00 AM 4.0 3.5 - 5.3 mmol/L Final     Chloride   Date/Time Value Ref Range Status   09/28/2023 05:00  98 - 107 mmol/L Final     Bicarbonate   Date/Time Value Ref Range Status   09/28/2023 05:00 AM 29 21 - 32 mmol/L Final     Anion Gap   Date/Time Value Ref Range Status   09/28/2023 05:00 AM 13 10 - 20 mmol/L Final     Urea Nitrogen   Date/Time Value Ref Range Status   09/28/2023 05:00 AM 19 6 - 23 mg/dL Final     Creatinine   Date/Time Value Ref Range Status   09/28/2023 05:00 AM 1.05 0.50 - 1.05 mg/dL Final     GFR MALE   Date/Time Value Ref  Range Status   09/25/2023 09:09 PM CANCELED       Comment:      CALCULATIONS OF ESTIMATED GFR ARE PERFORMED   USING THE 2021 CKD-EPI STUDY REFIT EQUATION   WITHOUT THE RACE VARIABLE FOR THE IDMS-TRACEABLE   CREATININE METHODS.    https://jasn.asnjournals.org/content/early/2021/09/22/ASN.[email or web address]    Result canceled by the ancillary.       GFR Female   Date/Time Value Ref Range Status   09/28/2023 05:00 AM 62 >90 mL/min/1.73m2 Final     Comment:      CALCULATIONS OF ESTIMATED GFR ARE PERFORMED   USING THE 2021 CKD-EPI STUDY REFIT EQUATION   WITHOUT THE RACE VARIABLE FOR THE IDMS-TRACEABLE   CREATININE METHODS.    https://jasn.asnjournals.org/content/early/2021/09/22/ASN.[email or web address]       Glucose   Date/Time Value Ref Range Status   09/28/2023 05:00  (H) 74 - 99 mg/dL Final     Calcium   Date/Time Value Ref Range Status   09/28/2023 05:00 AM 9.0 8.6 - 10.6 mg/dL Final     Phosphorus   Date/Time Value Ref Range Status   09/28/2023 05:00 AM 5.0 (H) 2.5 - 4.9 mg/dL Final     Comment:      The performance characteristics of phosphorus testing in   heparinized plasma have been validated by the individual     laboratory site where testing is performed. Testing    on heparinized plasma is not approved by the FDA;    however, such approval is not necessary.         Hematologic Parameters     WBC   Date/Time Value Ref Range Status   09/28/2023 05:00 AM 10.0 4.4 - 11.3 x10E9/L Final     Neutrophils Absolute   Date/Time Value Ref Range Status   09/28/2023 05:00 AM 5.68 1.20 - 7.70 x10E9/L Final     Neutrophils %   Date/Time Value Ref Range Status   09/28/2023 05:00 AM 56.9 40.0 - 80.0 % Final     Lymphocytes %   Date/Time Value Ref Range Status   09/28/2023 05:00 AM 30.1 13.0 - 44.0 % Final     Monocytes %   Date/Time Value Ref Range Status   09/28/2023 05:00 AM 10.8 2.0 - 10.0 % Final     Basophils %   Date/Time Value Ref Range Status   09/28/2023 05:00 AM 0.3 0.0 - 2.0 % Final     Eosinophils Absolute   Date/Time Value  Ref Range Status   09/28/2023 05:00 AM 0.15 0.00 - 0.70 x10E9/L Final     Eosinophils %   Date/Time Value Ref Range Status   09/28/2023 05:00 AM 1.5 0.0 - 6.0 % Final     Hemoglobin   Date/Time Value Ref Range Status   09/28/2023 05:00 AM 10.5 (L) 12.0 - 16.0 g/dL Final     Hematocrit   Date/Time Value Ref Range Status   09/28/2023 05:00 AM 33.6 (L) 36.0 - 46.0 % Final     RBC   Date/Time Value Ref Range Status   09/28/2023 05:00 AM 3.87 (L) 4.00 - 5.20 x10E12/L Final     MCV   Date/Time Value Ref Range Status   09/28/2023 05:00 AM 87 80 - 100 fL Final     MCHC   Date/Time Value Ref Range Status   09/28/2023 05:00 AM 31.3 (L) 32.0 - 36.0 g/dL Final     Platelets   Date/Time Value Ref Range Status   09/28/2023 05:00  150 - 450 x10E9/L Final         LFT and Associated Parameters     AST   Date/Time Value Ref Range Status   09/28/2023 05:00 AM 16 9 - 39 U/L Final     ALT (SGPT)   Date/Time Value Ref Range Status   09/28/2023 05:00 AM 29 7 - 45 U/L Final     Comment:      Patients treated with Sulfasalazine may generate    falsely decreased results for ALT.       Alkaline Phosphatase   Date/Time Value Ref Range Status   09/28/2023 05:00 AM 59 33 - 110 U/L Final     Total Bilirubin   Date/Time Value Ref Range Status   09/28/2023 05:00 AM 0.4 0.0 - 1.2 mg/dL Final     Bilirubin, Direct   Date/Time Value Ref Range Status   09/28/2023 05:00 AM 0.1 0.0 - 0.3 mg/dL Final     Albumin   Date/Time Value Ref Range Status   09/28/2023 05:00 AM 3.8 3.4 - 5.0 g/dL Final   09/28/2023 05:00 AM 3.8 3.4 - 5.0 g/dL Final     Total Protein   Date/Time Value Ref Range Status   09/28/2023 05:00 AM 6.6 6.4 - 8.2 g/dL Final       Coagulation Parameters     Protime   Date/Time Value Ref Range Status   09/27/2023 10:37 AM 11.5 9.8 - 12.8 sec Final     Comment:     Note new reference range as of 6/20/2023 at 10:00am.     INR   Date/Time Value Ref Range Status   09/27/2023 10:37 AM 1.0 0.9 - 1.1 Final       Immunology Laboratory Tests      Immunocap IgE   Date/Time Value Ref Range Status   11/07/2023 09:26  <=214 KU/L Final     Comment:     Note: Omalizumab (Xolair, Genentech; humanized  IgG1 antihuman IgE Fc) treatment does not  significantly interfere with the accuracy of  total IgE on the ImmunoCAP (dermSearch) platform.  J Allergy Clin Immunol 2006;117:759-66).  Allergens, parasitic diseases, smoking, and  alcohol consumption have been reported to  increase levels of total IgE in serum.     Aspergillus Fumigatus IgE   Date/Time Value Ref Range Status   11/07/2023 09:26 AM <0.10 <0.10 kU/L Final           Assessment and Plan / Recommendations:  Problem List Items Addressed This Visit       Sleep apnea    Overview     Formatting of this note might be different from the original. sleep apnea [id number]         Sarcoidosis of lung (CMS/[facility]) - Primary    Relevant Orders    Pulmonary Stress Test (6 Min. Walk)    DLCO / Diffusion Capacity    Spirometry Pre/Post Bronchodilator    Referral to Pulmonary Rehabilitation    Follow Up In Pulmonology    Moderate persistent asthma    Relevant Medications    Symbicort 160-4.5 mcg/actuation inhaler    albuterol 90 mcg/actuation inhaler    Other Relevant Orders    Pulmonary Stress Test (6 Min. Walk)    DLCO / Diffusion Capacity    Spirometry Pre/Post Bronchodilator      58 y old with hx of SVT s/p ablation of accessory bypass tract, HTN, fibromyalgia, sleep apnea on CPAP, asthma, DM, obesity, DL, asthma, elevated right hemidiaphragm, pleural thickening, and mediastinal LN presenting for follow up after hospital stay 9/25-9/28/2023 where bronchoscopy showed evidence of non caseating granulomas.         Assessment: Stage I sarcoidosis.      #Sarcoidosis  Stage I   No airway involvement on bronchoscopy   Dx in 9/203 by EBUS  Hx of SVT   -PFT with FVC 70%, TLC normal, DLCO 65%  -Refer to ophthalmology, cardiology done   -on just ICS /LABA inhaled     Plan:   -CBCD CMP calcium yearly (due Sept 2024)  -CT chest will  repeat in one year/ due in September 2024  -PFT will repeat now with spirometry and DLCO to assess no change with patient's reported SOB episodes  -6 min walk test  -Continue Symbicort 2 puffs BID   -Pulmonary rehab     #Asthma   No obstruction or BD response on PFT   Mod persistent with recent exacerbation in September   Allergic component  No eosinophilia   ACT 18 tdoay   Feno 14  ++Allergy panel      Plan:  -Referral to allergy scheduled   -Continue Symbicort BID 2 puffs with mouth rinse  -Continue albuterol PRN   -Pulmonary Rehab        #JAVI  -Sleep medicine referral scheduled     #Obesity   Recommended talking to her PCP about GLP candidacy for wt loss     #Elevated right hemidiaphragm  Images going back to 2018 cannot be seen but report mentions elevation   Sniff test positive however PFT supine normal   FU with sleep for PAP therapy  machine optimization      #pleural thickening     Patient was seen and discussed with Dr. Adler    Follow-up: 3-4 months       Kaylene Phelps MD   03/28/2024

## 2024-03-29 DIAGNOSIS — J45.40 MODERATE PERSISTENT ASTHMA, UNSPECIFIED WHETHER COMPLICATED (HHS-HCC): ICD-10-CM

## 2024-03-29 RX ORDER — BUDESONIDE AND FORMOTEROL FUMARATE DIHYDRATE 80; 4.5 UG/1; UG/1
AEROSOL RESPIRATORY (INHALATION)
Refills: 0 | OUTPATIENT
Start: 2024-03-29

## 2024-04-04 ENCOUNTER — HOSPITAL ENCOUNTER (OUTPATIENT)
Dept: RESPIRATORY THERAPY | Facility: HOSPITAL | Age: 59
Discharge: HOME | End: 2024-04-04
Payer: COMMERCIAL

## 2024-04-04 ENCOUNTER — LAB (OUTPATIENT)
Dept: LAB | Facility: LAB | Age: 59
End: 2024-04-04
Payer: COMMERCIAL

## 2024-04-04 DIAGNOSIS — D86.0 SARCOIDOSIS OF LUNG (MULTI): ICD-10-CM

## 2024-04-04 DIAGNOSIS — I10 ESSENTIAL (PRIMARY) HYPERTENSION: ICD-10-CM

## 2024-04-04 DIAGNOSIS — E11.9 TYPE 2 DIABETES MELLITUS WITHOUT COMPLICATIONS (MULTI): Primary | ICD-10-CM

## 2024-04-04 DIAGNOSIS — J45.40 MODERATE PERSISTENT ASTHMA, UNSPECIFIED WHETHER COMPLICATED (HHS-HCC): ICD-10-CM

## 2024-04-04 LAB
ALBUMIN SERPL BCP-MCNC: 4.2 G/DL (ref 3.4–5)
ALBUMIN SERPL BCP-MCNC: 4.3 G/DL (ref 3.4–5)
ALP SERPL-CCNC: 68 U/L (ref 33–110)
ALP SERPL-CCNC: 71 U/L (ref 33–110)
ALT SERPL W P-5'-P-CCNC: 17 U/L (ref 7–45)
ALT SERPL W P-5'-P-CCNC: 19 U/L (ref 7–45)
ANION GAP SERPL CALC-SCNC: 15 MMOL/L (ref 10–20)
ANION GAP SERPL CALC-SCNC: 17 MMOL/L (ref 10–20)
AST SERPL W P-5'-P-CCNC: 17 U/L (ref 9–39)
AST SERPL W P-5'-P-CCNC: 17 U/L (ref 9–39)
BASOPHILS # BLD AUTO: 0.03 X10*3/UL (ref 0–0.1)
BASOPHILS # BLD AUTO: 0.04 X10*3/UL (ref 0–0.1)
BASOPHILS NFR BLD AUTO: 0.4 %
BASOPHILS NFR BLD AUTO: 0.5 %
BILIRUB SERPL-MCNC: 0.5 MG/DL (ref 0–1.2)
BILIRUB SERPL-MCNC: 0.5 MG/DL (ref 0–1.2)
BUN SERPL-MCNC: 16 MG/DL (ref 6–23)
BUN SERPL-MCNC: 17 MG/DL (ref 6–23)
CA-I BLD-SCNC: 1.2 MMOL/L (ref 1.1–1.33)
CALCIUM SERPL-MCNC: 9.8 MG/DL (ref 8.6–10.6)
CALCIUM SERPL-MCNC: 9.8 MG/DL (ref 8.6–10.6)
CHLORIDE SERPL-SCNC: 102 MMOL/L (ref 98–107)
CHLORIDE SERPL-SCNC: 103 MMOL/L (ref 98–107)
CHOLEST SERPL-MCNC: 149 MG/DL (ref 0–199)
CHOLESTEROL/HDL RATIO: 3.8
CO2 SERPL-SCNC: 26 MMOL/L (ref 21–32)
CO2 SERPL-SCNC: 27 MMOL/L (ref 21–32)
CREAT SERPL-MCNC: 0.99 MG/DL (ref 0.5–1.05)
CREAT SERPL-MCNC: 1 MG/DL (ref 0.5–1.05)
EGFRCR SERPLBLD CKD-EPI 2021: 66 ML/MIN/1.73M*2
EGFRCR SERPLBLD CKD-EPI 2021: 67 ML/MIN/1.73M*2
EOSINOPHIL # BLD AUTO: 0.32 X10*3/UL (ref 0–0.7)
EOSINOPHIL # BLD AUTO: 0.33 X10*3/UL (ref 0–0.7)
EOSINOPHIL NFR BLD AUTO: 3.8 %
EOSINOPHIL NFR BLD AUTO: 3.9 %
ERYTHROCYTE [DISTWIDTH] IN BLOOD BY AUTOMATED COUNT: 13.8 % (ref 11.5–14.5)
ERYTHROCYTE [DISTWIDTH] IN BLOOD BY AUTOMATED COUNT: 13.9 % (ref 11.5–14.5)
EST. AVERAGE GLUCOSE BLD GHB EST-MCNC: 160 MG/DL
GLUCOSE SERPL-MCNC: 121 MG/DL (ref 74–99)
GLUCOSE SERPL-MCNC: 122 MG/DL (ref 74–99)
HBA1C MFR BLD: 7.2 %
HCT VFR BLD AUTO: 38.6 % (ref 36–46)
HCT VFR BLD AUTO: 39.1 % (ref 36–46)
HDLC SERPL-MCNC: 38.9 MG/DL
HGB BLD-MCNC: 12.6 G/DL (ref 12–16)
HGB BLD-MCNC: 12.8 G/DL (ref 12–16)
IMM GRANULOCYTES # BLD AUTO: 0.02 X10*3/UL (ref 0–0.7)
IMM GRANULOCYTES # BLD AUTO: 0.03 X10*3/UL (ref 0–0.7)
IMM GRANULOCYTES NFR BLD AUTO: 0.2 % (ref 0–0.9)
IMM GRANULOCYTES NFR BLD AUTO: 0.4 % (ref 0–0.9)
LDLC SERPL CALC-MCNC: 78 MG/DL
LYMPHOCYTES # BLD AUTO: 1.91 X10*3/UL (ref 1.2–4.8)
LYMPHOCYTES # BLD AUTO: 1.92 X10*3/UL (ref 1.2–4.8)
LYMPHOCYTES NFR BLD AUTO: 22.4 %
LYMPHOCYTES NFR BLD AUTO: 22.6 %
MCH RBC QN AUTO: 27.4 PG (ref 26–34)
MCH RBC QN AUTO: 27.5 PG (ref 26–34)
MCHC RBC AUTO-ENTMCNC: 32.6 G/DL (ref 32–36)
MCHC RBC AUTO-ENTMCNC: 32.7 G/DL (ref 32–36)
MCV RBC AUTO: 84 FL (ref 80–100)
MCV RBC AUTO: 84 FL (ref 80–100)
MGC ASCENT PFT - FEV1 - PRE: 1.67
MGC ASCENT PFT - FEV1 - PRE: 1.67
MGC ASCENT PFT - FEV1 - PREDICTED: 2.47
MGC ASCENT PFT - FEV1 - PREDICTED: 2.47
MGC ASCENT PFT - FVC - PRE: 2.07
MGC ASCENT PFT - FVC - PRE: 2.07
MGC ASCENT PFT - FVC - PREDICTED: 3.07
MGC ASCENT PFT - FVC - PREDICTED: 3.07
MONOCYTES # BLD AUTO: 0.63 X10*3/UL (ref 0.1–1)
MONOCYTES # BLD AUTO: 0.67 X10*3/UL (ref 0.1–1)
MONOCYTES NFR BLD AUTO: 7.4 %
MONOCYTES NFR BLD AUTO: 7.9 %
NEUTROPHILS # BLD AUTO: 5.55 X10*3/UL (ref 1.2–7.7)
NEUTROPHILS # BLD AUTO: 5.58 X10*3/UL (ref 1.2–7.7)
NEUTROPHILS NFR BLD AUTO: 65.1 %
NEUTROPHILS NFR BLD AUTO: 65.4 %
NON HDL CHOLESTEROL: 110 MG/DL (ref 0–149)
NRBC BLD-RTO: 0 /100 WBCS (ref 0–0)
NRBC BLD-RTO: 0 /100 WBCS (ref 0–0)
PLATELET # BLD AUTO: 423 X10*3/UL (ref 150–450)
PLATELET # BLD AUTO: 427 X10*3/UL (ref 150–450)
POTASSIUM SERPL-SCNC: 4.1 MMOL/L (ref 3.5–5.3)
POTASSIUM SERPL-SCNC: 4.1 MMOL/L (ref 3.5–5.3)
PROT SERPL-MCNC: 7.7 G/DL (ref 6.4–8.2)
PROT SERPL-MCNC: 7.7 G/DL (ref 6.4–8.2)
RBC # BLD AUTO: 4.6 X10*6/UL (ref 4–5.2)
RBC # BLD AUTO: 4.65 X10*6/UL (ref 4–5.2)
SODIUM SERPL-SCNC: 141 MMOL/L (ref 136–145)
SODIUM SERPL-SCNC: 141 MMOL/L (ref 136–145)
TRIGL SERPL-MCNC: 162 MG/DL (ref 0–149)
VLDL: 32 MG/DL (ref 0–40)
WBC # BLD AUTO: 8.5 X10*3/UL (ref 4.4–11.3)
WBC # BLD AUTO: 8.5 X10*3/UL (ref 4.4–11.3)

## 2024-04-04 PROCEDURE — 80053 COMPREHEN METABOLIC PANEL: CPT

## 2024-04-04 PROCEDURE — 82330 ASSAY OF CALCIUM: CPT

## 2024-04-04 PROCEDURE — 94010 BREATHING CAPACITY TEST: CPT | Performed by: INTERNAL MEDICINE

## 2024-04-04 PROCEDURE — 36415 COLL VENOUS BLD VENIPUNCTURE: CPT

## 2024-04-04 PROCEDURE — 94618 PULMONARY STRESS TESTING: CPT | Performed by: INTERNAL MEDICINE

## 2024-04-04 PROCEDURE — 94729 DIFFUSING CAPACITY: CPT

## 2024-04-04 PROCEDURE — 85025 COMPLETE CBC W/AUTO DIFF WBC: CPT

## 2024-04-04 PROCEDURE — 94729 DIFFUSING CAPACITY: CPT | Performed by: INTERNAL MEDICINE

## 2024-04-04 PROCEDURE — 82652 VIT D 1 25-DIHYDROXY: CPT

## 2024-04-04 PROCEDURE — 83036 HEMOGLOBIN GLYCOSYLATED A1C: CPT

## 2024-04-04 PROCEDURE — 80061 LIPID PANEL: CPT

## 2024-04-06 LAB — 1,25(OH)2D3 SERPL-MCNC: 40.4 PG/ML (ref 19.9–79.3)

## 2024-04-08 ENCOUNTER — OFFICE VISIT (OUTPATIENT)
Dept: CARDIOLOGY | Facility: CLINIC | Age: 59
End: 2024-04-08
Payer: COMMERCIAL

## 2024-04-08 VITALS
WEIGHT: 293 LBS | BODY MASS INDEX: 50.02 KG/M2 | RESPIRATION RATE: 18 BRPM | HEART RATE: 74 BPM | SYSTOLIC BLOOD PRESSURE: 118 MMHG | OXYGEN SATURATION: 96 % | DIASTOLIC BLOOD PRESSURE: 60 MMHG | HEIGHT: 64 IN

## 2024-04-08 DIAGNOSIS — D86.9 SARCOIDOSIS: Primary | ICD-10-CM

## 2024-04-08 DIAGNOSIS — Z98.890 S/P ABLATION OF ACCESSORY BYPASS TRACT: ICD-10-CM

## 2024-04-08 DIAGNOSIS — R00.2 PALPITATIONS: ICD-10-CM

## 2024-04-08 DIAGNOSIS — I10 BENIGN ESSENTIAL HYPERTENSION: ICD-10-CM

## 2024-04-08 DIAGNOSIS — I47.10 SVT (SUPRAVENTRICULAR TACHYCARDIA) (CMS-HCC): ICD-10-CM

## 2024-04-08 DIAGNOSIS — R06.09 DOE (DYSPNEA ON EXERTION): ICD-10-CM

## 2024-04-08 PROCEDURE — 3074F SYST BP LT 130 MM HG: CPT | Performed by: STUDENT IN AN ORGANIZED HEALTH CARE EDUCATION/TRAINING PROGRAM

## 2024-04-08 PROCEDURE — 3078F DIAST BP <80 MM HG: CPT | Performed by: STUDENT IN AN ORGANIZED HEALTH CARE EDUCATION/TRAINING PROGRAM

## 2024-04-08 PROCEDURE — 3008F BODY MASS INDEX DOCD: CPT | Performed by: STUDENT IN AN ORGANIZED HEALTH CARE EDUCATION/TRAINING PROGRAM

## 2024-04-08 PROCEDURE — 1036F TOBACCO NON-USER: CPT | Performed by: STUDENT IN AN ORGANIZED HEALTH CARE EDUCATION/TRAINING PROGRAM

## 2024-04-08 PROCEDURE — 99214 OFFICE O/P EST MOD 30 MIN: CPT | Performed by: STUDENT IN AN ORGANIZED HEALTH CARE EDUCATION/TRAINING PROGRAM

## 2024-04-08 ASSESSMENT — PATIENT HEALTH QUESTIONNAIRE - PHQ9
SUM OF ALL RESPONSES TO PHQ9 QUESTIONS 1 & 2: 0
2. FEELING DOWN, DEPRESSED OR HOPELESS: NOT AT ALL
1. LITTLE INTEREST OR PLEASURE IN DOING THINGS: NOT AT ALL

## 2024-04-08 ASSESSMENT — PAIN SCALES - GENERAL: PAINLEVEL: 0-NO PAIN

## 2024-04-08 NOTE — PATIENT INSTRUCTIONS
Please continue current cardiac medications.    Please followup with me in Cardiology clinic within the next 6-7 months.  Please return to clinic sooner or seek emergent care if your symptoms reoccur or worsen.

## 2024-04-08 NOTE — PROGRESS NOTES
Follow-up     HPI:    Marian Zhou is a 58 y.o. female with pertinent history of hypertension, palpitations, paroxysmal supraventricular tachycardia status post ablation of accessory bypass tract, granulomatosis lymphadenitis, osteoarthritis, obstructive sleep apnea, sarcoidosis involving the lungs, likely component of underlying nonobstructive coronary artery disease with moderate coronary artery calcification on CT PE performed 9/24/2023, preserved ejection fraction on echo performed 3/5/2024 presents to cardiology clinic for follow-up.    She is doing relatively well.  Dyspnea on exertion is relatively stable.  She does note some night sweats that she believes may be connected to menopause.  We have prolonged discussion about her recent echocardiogram as well as her prior CT of the chest as well as the natural history of coronary artery disease.  No exacerbating or relieving factors.  Patient denies chest pain and angina.  Pt denies orthopnea, and paroxysmal nocturnal dyspnea.  Pt denies worsening lower extremity edema.  Pt denies palpitations or syncope.  No recent falls.  No fever or chills.  No cough.  No change in bowel or bladder habits.  No sick contacts.  No recent travel.    12 point review of systems including (Constitutional, Eyes, ENMT, Respiratory, Cardiac, Gastrointestinal, Neurological, Psychiatric, and Hematologic) was performed and is otherwise negative.    Past medical history reviewed:   has a past medical history of Other intervertebral disc displacement, lumbar region, Personal history of other diseases of the musculoskeletal system and connective tissue, and Sciatica, left side.    Past surgical history reviewed:   has a past surgical history that includes Other surgical history (09/14/2020); Other surgical history (09/14/2020); Other surgical history (09/14/2020); and Breast biopsy (Right).    Social history reviewed:   reports that she has never smoked. She has never been exposed to  tobacco smoke. She has never used smokeless tobacco. She reports current alcohol use. She reports that she does not currently use drugs.     Family history reviewed:    Family History   Problem Relation Name Age of Onset    Hypertension Mother          Essential    Diabetes Mother      Rheum arthritis Mother      Rheum arthritis Daughter         Allergies reviewed: Penicillins     Medications reviewed:   Current Outpatient Medications   Medication Instructions    albuterol 2.5 mg /3 mL (0.083 %) nebulizer solution     albuterol 90 mcg/actuation inhaler 2 puffs, inhalation, Every 6 hours PRN    atorvastatin (LIPITOR) 20 mg, oral, Daily    celecoxib (CeleBREX) 200 mg capsule oral    chlorzoxazone (Lorzone) 375 mg tablet     estradiol (ESTRACE) 2 g, vaginal, Nightly, At bedtime for 2 weeks, then at bedtime twice a week.    fluticasone (Flonase) 50 mcg/actuation nasal spray 1 spray, Each Nostril, Daily, Shake gently. Before first use, prime pump. After use, clean tip and replace cap.    gabapentin (NEURONTIN) 100 mg, oral, 3 times daily    ipratropium-albuteroL (Duo-Neb) 0.5-2.5 mg/3 mL nebulizer solution     melatonin 10 mg tablet, sublingual 1 tablet, oral, Nightly    metFORMIN (GLUCOPHAGE) 500 mg, oral, Daily    metoprolol tartrate (LOPRESSOR) 100 mg, oral, 2 times daily    montelukast (Singulair) 10 mg tablet     pregabalin (LYRICA) 200 mg, oral, Daily    Symbicort 160-4.5 mcg/actuation inhaler 2 puffs, inhalation, 2 times daily RT, Rinse mouth with water after use to reduce aftertaste and incidence of candidiasis. Do not swallow.    traMADol (ULTRAM) 50 mg, oral, Every 6 hours PRN    triamterene-hydrochlorothiazid (Maxzide) 75-50 mg tablet         Vitals reviewed: Visit Vitals  /60   Pulse 74   Resp 18       Physical Exam:   General:  Patient is awake, alert, and oriented.  Patient is in no acute distress.  HEENT:  Pupils equal and reactive.  Normocephalic.  Moist mucosa.    Neck:  No thyromegaly.  Normal  Jugular Venous Pressure.  Cardiovascular:  Regular rate and rhythm.  Normal S1 and S2.  1/6 MEET.  Pulmonary:  Clear to auscultation bilaterally.  Abdomen:  Soft. Non-tender.   Non-distended.  Positive bowel sounds.  Lower Extremities:  2+ pedal pulses. No LE edema.  Neurologic:  Cranial nerves intact.  No focal deficit.   Skin: Skin warm and dry, normal skin turgor.   Psychiatric: Normal affect.    Last Labs:  CBC -      Lab Results   Component Value Date    WBC 8.5 04/04/2024    WBC 8.5 04/04/2024    HGB 12.6 04/04/2024    HGB 12.8 04/04/2024    HCT 38.6 04/04/2024    HCT 39.1 04/04/2024     04/04/2024     04/04/2024        CMP-  Lab Results   Component Value Date    GLUCOSE 121 (H) 04/04/2024    GLUCOSE 122 (H) 04/04/2024     04/04/2024     04/04/2024    K 4.1 04/04/2024    K 4.1 04/04/2024     04/04/2024     04/04/2024    CO2 26 04/04/2024    CO2 27 04/04/2024    ANIONGAP 17 04/04/2024    ANIONGAP 15 04/04/2024    BUN 17 04/04/2024    BUN 16 04/04/2024    CREATININE 0.99 04/04/2024    CREATININE 1.00 04/04/2024    EGFR 67 04/04/2024    EGFR 66 04/04/2024    CALCIUM 9.8 04/04/2024    CALCIUM 9.8 04/04/2024    PHOS 5.0 (H) 09/28/2023    PROT 7.7 04/04/2024    PROT 7.7 04/04/2024    ALBUMIN 4.2 04/04/2024    ALBUMIN 4.3 04/04/2024    AST 17 04/04/2024    AST 17 04/04/2024    ALT 17 04/04/2024    ALT 19 04/04/2024    ALKPHOS 68 04/04/2024    ALKPHOS 71 04/04/2024    BILITOT 0.5 04/04/2024    BILITOT 0.5 04/04/2024        LIPIDS-  Lab Results   Component Value Date    CHOL 149 04/04/2024    TRIG 162 (H) 04/04/2024    HDL 38.9 04/04/2024    CHHDL 3.8 04/04/2024    VLDL 32 04/04/2024        OTHERS-  Lab Results   Component Value Date    HGBA1C 7.2 (H) 04/04/2024    BNP 90 09/24/2023        I personally reviewed the patient's recent vitals, labs, medications, orders, EKGs, pertinent cardiac imaging, and echocardiogram.    Assessment and Plan:    Marian Zhou is a 58 y.o. female  with pertinent history of hypertension, palpitations, paroxysmal supraventricular tachycardia status post ablation of accessory bypass tract, granulomatosis lymphadenitis, osteoarthritis, obstructive sleep apnea, sarcoidosis involving the lungs, likely component of underlying nonobstructive coronary artery disease with moderate coronary artery calcification on CT PE performed 9/24/2023, preserved ejection fraction on echo performed 3/5/2024 presents to cardiology clinic for follow-up.  She is doing relatively well.  Dyspnea on exertion is relatively stable.  She does note some night sweats that she believes may be connected to menopause.  We have prolonged discussion about her recent echocardiogram as well as her prior CT of the chest as well as the natural history of coronary artery disease.      We did discuss in the future and low threshold to consider ischemic evaluation with stress testing.  She would like to increase exercise and try weight loss first.  Will monitor symptoms at this time.    Please continue current cardiac medications.    Please followup with me in Cardiology clinic within the next 6-7 months.  Please return to clinic sooner or seek emergent care if your symptoms reoccur or worsen.    Thank you for allowing me to participate in their care.  Please feel free to call me with any further questions or concerns.        Jeremy Silva MD, FACC, SHANTEL DEE  Division of Cardiovascular Medicine  Medical Director, [facility]  Assistant Clinical Professor, Medicine  [employer]  [email or web address]  Office:  [phone]

## 2024-04-09 ENCOUNTER — TELEPHONE (OUTPATIENT)
Dept: PULMONOLOGY | Facility: HOSPITAL | Age: 59
End: 2024-04-09
Payer: COMMERCIAL

## 2024-04-09 ENCOUNTER — OFFICE VISIT (OUTPATIENT)
Dept: SLEEP MEDICINE | Facility: HOSPITAL | Age: 59
End: 2024-04-09
Payer: COMMERCIAL

## 2024-04-09 VITALS
HEART RATE: 69 BPM | SYSTOLIC BLOOD PRESSURE: 125 MMHG | WEIGHT: 293 LBS | TEMPERATURE: 95 F | BODY MASS INDEX: 51.15 KG/M2 | OXYGEN SATURATION: 96 % | DIASTOLIC BLOOD PRESSURE: 71 MMHG

## 2024-04-09 DIAGNOSIS — G47.33 OBSTRUCTIVE SLEEP APNEA SYNDROME: Primary | ICD-10-CM

## 2024-04-09 PROCEDURE — 99214 OFFICE O/P EST MOD 30 MIN: CPT | Mod: GC | Performed by: INTERNAL MEDICINE

## 2024-04-09 PROCEDURE — 3074F SYST BP LT 130 MM HG: CPT | Performed by: INTERNAL MEDICINE

## 2024-04-09 PROCEDURE — 3008F BODY MASS INDEX DOCD: CPT | Performed by: INTERNAL MEDICINE

## 2024-04-09 PROCEDURE — 3078F DIAST BP <80 MM HG: CPT | Performed by: INTERNAL MEDICINE

## 2024-04-09 PROCEDURE — 1036F TOBACCO NON-USER: CPT | Performed by: INTERNAL MEDICINE

## 2024-04-09 PROCEDURE — 99204 OFFICE O/P NEW MOD 45 MIN: CPT | Performed by: INTERNAL MEDICINE

## 2024-04-09 SDOH — ECONOMIC STABILITY: FOOD INSECURITY: WITHIN THE PAST 12 MONTHS, THE FOOD YOU BOUGHT JUST DIDN'T LAST AND YOU DIDN'T HAVE MONEY TO GET MORE.: OFTEN TRUE

## 2024-04-09 SDOH — ECONOMIC STABILITY: FOOD INSECURITY: WITHIN THE PAST 12 MONTHS, YOU WORRIED THAT YOUR FOOD WOULD RUN OUT BEFORE YOU GOT MONEY TO BUY MORE.: OFTEN TRUE

## 2024-04-09 ASSESSMENT — PATIENT HEALTH QUESTIONNAIRE - PHQ9
SUM OF ALL RESPONSES TO PHQ9 QUESTIONS 1 & 2: 0
1. LITTLE INTEREST OR PLEASURE IN DOING THINGS: NOT AT ALL
2. FEELING DOWN, DEPRESSED OR HOPELESS: NOT AT ALL

## 2024-04-09 ASSESSMENT — LIFESTYLE VARIABLES
HOW OFTEN DO YOU HAVE A DRINK CONTAINING ALCOHOL: MONTHLY OR LESS
SKIP TO QUESTIONS 9-10: 0
HOW MANY STANDARD DRINKS CONTAINING ALCOHOL DO YOU HAVE ON A TYPICAL DAY: 1 OR 2
AUDIT-C TOTAL SCORE: 2
HOW OFTEN DO YOU HAVE SIX OR MORE DRINKS ON ONE OCCASION: LESS THAN MONTHLY

## 2024-04-09 ASSESSMENT — PAIN SCALES - GENERAL: PAINLEVEL: 0-NO PAIN

## 2024-04-09 NOTE — PATIENT INSTRUCTIONS
[facility] Sleep Medicine  [facility]  [address]  [facility] [address]  [phone]       NAME: Marian Zhou   DATE: 4/9/2024     Your Sleep Provider Today: Antwon Mckeon MD  Your Primary Care Physician: No primary care provider on file.   Your Referring Provider: Antwon Mckeon MD    DIAGNOSIS:   1. Obstructive sleep apnea syndrome  Positive Airway Pressure (PAP) Therapy          Thank you for coming to the Sleep Medicine Clinic today! Your sleep medicine provider today was: Antwon Mckeon MD Below is a summary of your treatment plan, other important information, and our contact numbers:      TREATMENT PLAN     Please restart using your CPAP, do your best to keep it on nightly for at least 4 hours a night  Lets see you back in about 3 months to see how you are doing with it.     IMPORTANT INFORMATION     Call 911 for medical emergencies.  Our offices are generally open from Monday-Friday, 9 am - 5 pm.  If you need to get in touch with me, you may either call me and my team(number is below) or you can use Stayzilla.  If a referral for a test, for CPAP, or for another specialist was made, and you have not heard about scheduling this within a week, please call scheduling at [phone] ([phone]).  If you are unable to make your appointment for clinic or an overnight study, kindly call the office at least 48 hours in advance to cancel and reschedule.  If you are on CPAP, please bring your device's card or the device to each clinic appointment.   There are no supporting services by either the sleep doctors or their staff on weekends and Holidays, or after 5 PM on weekdays.   If you have been asked to come to a sleep study, make sure you bring toiletries, a comfy pillow, and any nighttime medications that you may regularly take. Also be sure to eat dinner before you arrive. We generally do not provide meals.      PRESCRIPTIONS     We  require 7 days advanced notice for prescription refills. If we do not receive the request in this time, we cannot guarantee that your medication will be refilled in time.      IMPORTANT PHONE NUMBERS     Sleep Medicine Clinic Fax: [phone]  Appointments (for Pediatric Sleep Clinic): [phone] - option 1  Appointments (for Adult Sleep Clinic): [phone] - option 2  Appointments (For Sleep Studies): [phone] - option 3  Behavioral Sleep Medicine: [phone]  Sleep Surgery: [phone]  ENT (Otolaryngology): [phone]  Headache Clinic (Neurology): [phone]  Neurology: [phone]  Psychiatry: [phone]  Pulmonary Function Testing (PFT) Center: [phone]  Pulmonary Medicine: [phone]  [address] (DME): [phone]  [place] (DME): [phone]  [facility] (DME): [phone]      OUR ADULT SLEEP MEDICINE TEAM   Please do not hesitate to call the office or sleep nurse with any questions between appointments:    Adult Sleep Nurses (Andreina Mart, RN and Britt Grier RN):  For clinical questions and refilling prescriptions: [phone]  Email sleep diaries and other documents at: [email or web address]    Adult Sleep Medicine Secretaries:  Catherine Quinones (For Mike/Bhatt/Krise/Strohl/Yeh/Vargas):   P: [phone]  F: [phone]  Nory Brice (For Balder[place]): P: [phone]  Fax: [phone]  Jennifer Bowie (For Jurcevic/Blank): P: [phone]  F: [phone]  Kailey Philippe (For [place]): P: [phone]  F: [phone]  Chastity Vaughn (For Xenia/Safia/Zakhary): P: [phone]  F: [phone]  Keira Mckenzie (For Brandin/Eligio): P: [phone]  F: [phone]     Adult Sleep Medicine Advanced Practice Providers:  Wilfredo Gaona ([place], [place])  Page Baig ([facility])  Cherri Vega CNP ([facility], [facility])  Kitty Joel, EDMUNDO ([place], [place], [place])  Parth,  "Elena ([place], [place], [place])  Gerson Del Valle CNP ([place], [place])        OUR SLEEP TESTING LOCATIONS     Our team will contact you to schedule your sleep study, however, you can contact us as follow:  Main Phone Line (scheduling only): [phone]), option 3  Adult and Pediatric Locations  [facility] (6 years and older): [facility] - 4th floor ([address], [facility] [facility]) After hours line: [phone]  [facility] (Main campus: All ages): [facility], 6th floor. After hours line: [phone]   [place] (5 years and older; younger considered on case-by-case basis): [address]; [address], [address]. Scheduling  After hours line: [phone]   [place] (6 years and older): [address]; [address]; [address]   [place] (6 years and older): [address], [address]  After hours line: [phone]   [facility] orthodox (13 years and older) in [place]: [address], 2nd floor  After hours line: [phone]  [facility] (13 year and older): [address]  After hours line: [phone]     Adult Only Locations:   [place] (18 years and older): [address], 2nd floor   [place] (18 years and older): [address]; 4th floor  After hours line: [phone]  [facility] (18 years and older) at [place]: [address]  After hours line: [phone]          CONTACTING YOUR SLEEP MEDICINE PROVIDER     Send a message directly to your provider through \"My Chart\", which is the email service through your  Records Account: [email or web address]   Call [phone] and leave a message. One of the administrative assistants will forward the message to your sleep medicine provider through \"My Chart\" and/or email.     Your sleep medicine provider for this visit was: Antwon Mckeon MD      "

## 2024-04-09 NOTE — TELEPHONE ENCOUNTER
Reviewed PFT: FVC 2.07 (similar to PFT in Nov 2023 1.98), DLCO stable too if not higher   6 min walk 110%, 90% edy     Patient informed about the above results and blood work.   She will be followed in June, if worsening SOB or sx before then to call clinic to re evaluate.

## 2024-04-09 NOTE — PROGRESS NOTES
Patient: Marian Zhou    [id number]  : 1965 -- AGE 58 y.o.    Provider: Cindy Louise MD     Location [facility]   Service Date: 2024          [facility] Sleep Medicine Clinic  New patient    This is a 59 y/o F with hx of sarcoidosis, SVT s/p ablation of accessory bypass tract, HTN, fibromyalgia, sleep apnea on CPAP, asthma, DM, obesity, DL, asthma, elevated right hemidiaphragm, pleural thickening, and mediastinal LN.    She is here due to JAVI. Has a CPAP but hasn't used in 10+ months. SShe reports night sweats, loud snoring, choking/gasping, occasional headaches.  She wants to get back on CPAP. Unsure when her last sleep study was, thinks that it was after COVID and that it was in the severe range.     When she did use CPAP she did have benefit from the device. She would be able to fall asleep faster and stay asleep for the night. Noted that she only used it for about 3-4 hours nightly but even then she had better nights of sleep. She noticed more energy during the day.     Caffeine use: rare use  Sleep aids: none    [place] today:     PAP Adherence/Download information:  No use on SD card  DME: [employer] (will switch to [facility])  Mask type: nasal pillows    ROS: as above    Physical Exam:  /71 (BP Location: Right arm, Patient Position: Sitting)   Pulse 69   Temp 35 °C (95 °F) (Temporal)   Wt 135 kg (298 lb)   LMP  (LMP Unknown)   SpO2 96% Comment: RA  BMI 51.15 kg/m²   General: well appearing, no acute distress  ENT: No nasopharyngeal edema, turbinate hypertrophy or deviated septum. No retroagnathia. No tongue scalloping.  Uvula midline. Mallampati 3  Neck: Supple, soft, no LAD, no thyromegaly  Neuro: alert, CN 2-12 intact, gait normal    Assessment and Plan:  Marian Zhou is a 58 y.o. female presenting today to discuss JAVI. Patient has not used her CPAP in a long time but would like to restart using due to poor sleep. Will change her CPAP settings to  APAP 8-16 cmH20 with EPR of 3. We showed her how to use her CPAP again and put her mask on. Will follow up in about 3 months    Attestation:  Patient seen and discussed with attending Dr. Mckeon at the time of the encounter.       Attending Addendum:   NPV. H/O sarcoidosis and JAVI. BMI 52.0. Has a ResVent CPAP device at 10 cm H2O via [employer] but not using it x 10 months.  Will change her CPAP settings to 8-16 cmH2O. Switching from [employer] to [facility]. She is agreeable to the change. RTC 4 months. She verbalized understanding.     Antwon Mckeon MD, FCCP, FAA  Staff Physician  Division of Pulmonary, Critical Care, and Sleep Medicine  [facility]  Clinical Associate Professor of Medicine  [facility]

## 2024-05-08 ENCOUNTER — CONSULT (OUTPATIENT)
Dept: ALLERGY | Facility: HOSPITAL | Age: 59
End: 2024-05-08
Payer: MEDICARE

## 2024-05-08 VITALS
BODY MASS INDEX: 50.02 KG/M2 | DIASTOLIC BLOOD PRESSURE: 90 MMHG | HEART RATE: 105 BPM | TEMPERATURE: 98.2 F | SYSTOLIC BLOOD PRESSURE: 154 MMHG | HEIGHT: 64 IN | WEIGHT: 293 LBS

## 2024-05-08 DIAGNOSIS — J45.40 MODERATE PERSISTENT ASTHMA WITHOUT COMPLICATION (HHS-HCC): ICD-10-CM

## 2024-05-08 DIAGNOSIS — J30.81 ALLERGIC RHINITIS DUE TO ANIMAL HAIR AND DANDER: ICD-10-CM

## 2024-05-08 DIAGNOSIS — H10.13 ALLERGIC CONJUNCTIVITIS OF BOTH EYES: ICD-10-CM

## 2024-05-08 DIAGNOSIS — J30.1 SEASONAL ALLERGIC RHINITIS DUE TO POLLEN: Primary | ICD-10-CM

## 2024-05-08 DIAGNOSIS — J30.89 ALLERGIC RHINITIS DUE TO DUST MITE: ICD-10-CM

## 2024-05-08 PROCEDURE — 99204 OFFICE O/P NEW MOD 45 MIN: CPT | Performed by: ALLERGY & IMMUNOLOGY

## 2024-05-08 PROCEDURE — 99214 OFFICE O/P EST MOD 30 MIN: CPT | Performed by: ALLERGY & IMMUNOLOGY

## 2024-05-08 RX ORDER — BLOOD SUGAR DIAGNOSTIC
STRIP MISCELLANEOUS
COMMUNITY
Start: 2024-04-18

## 2024-05-08 RX ORDER — LOSARTAN POTASSIUM 100 MG/1
100 TABLET ORAL DAILY
COMMUNITY
Start: 2024-04-03

## 2024-05-08 RX ORDER — INHALER, ASSIST DEVICES
SPACER (EA) MISCELLANEOUS
Qty: 1 EACH | Refills: 0 | Status: SHIPPED | OUTPATIENT
Start: 2024-05-08

## 2024-05-08 RX ORDER — OLOPATADINE HYDROCHLORIDE 2 MG/ML
1 SOLUTION/ DROPS OPHTHALMIC DAILY PRN
Qty: 2.5 ML | Refills: 11 | Status: SHIPPED | OUTPATIENT
Start: 2024-05-08 | End: 2025-05-08

## 2024-05-08 RX ORDER — LANCETS 30 GAUGE
EACH MISCELLANEOUS
COMMUNITY
Start: 2024-04-18

## 2024-05-08 RX ORDER — FLUTICASONE PROPIONATE 50 MCG
2 SPRAY, SUSPENSION (ML) NASAL DAILY
Qty: 16 G | Refills: 11 | Status: SHIPPED | OUTPATIENT
Start: 2024-05-08 | End: 2025-05-08

## 2024-05-08 RX ORDER — LEVOCETIRIZINE DIHYDROCHLORIDE 5 MG/1
5 TABLET, FILM COATED ORAL DAILY
Qty: 30 TABLET | Refills: 11 | Status: SHIPPED | OUTPATIENT
Start: 2024-05-08 | End: 2025-05-08

## 2024-05-08 RX ORDER — CYCLOBENZAPRINE HCL 10 MG
TABLET ORAL
COMMUNITY
Start: 2022-04-04

## 2024-05-08 ASSESSMENT — ENCOUNTER SYMPTOMS
EYES NEGATIVE: 1
DEPRESSION: 0
LOSS OF SENSATION IN FEET: 0
CONSTITUTIONAL NEGATIVE: 1
RESPIRATORY NEGATIVE: 1
ALLERGIC/IMMUNOLOGIC NEGATIVE: 1
HEMATOLOGIC/LYMPHATIC NEGATIVE: 1
GASTROINTESTINAL NEGATIVE: 1
CARDIOVASCULAR NEGATIVE: 1
OCCASIONAL FEELINGS OF UNSTEADINESS: 0
MUSCULOSKELETAL NEGATIVE: 1

## 2024-05-08 NOTE — PATIENT INSTRUCTIONS
It was nice to meet you today    Your allergy testing was positive to tree, grass, weed pollen; cat, dog, dust mite, mold cockroach.  Please see below for environmental allergen avoidance recommendations.      You may use Flonase 2 sprays each nostril once daily    Technique for nasal spray administration:  First, look slightly down, breathe normally, you do not need to sniff while you spray.  To use the nose spray, place the tip in your nose with the opposite hand (left hand, right side of nose, right hand, left side of nose), and aim or point toward the outside of the nose.  Do not sniff or snort medication afterwards as this can cause most of the medication to be swallowed.  Rather, dab your nose with a tissue if any runs out.    You may use levocetirizine (Xyzal) 5 mg once daily as needed    Stop Singulair (montelukast)    You may use olopatadine 0.2% eyedrops 1 drop each eye daily as needed    Recommend using spacer with your inhalers.  A prescription has been sent to your pharmacy     We would like to see you in follow up in 6 months or sooner if needed    Thank you for your visit to the [facility].  Part of a successful visit is taking home the information you learned today and using it to make things better.  These take home instructions are to help you, if you have questions or concerns, please contact us.     Please see below for recommendations on environmental allergy control or modification:     Pollen Avoidance--If you are sensitive to pollen, there are a few tips to help limit, but          not avoid, exposure.     Minimize outdoor activity between 5am-10am, pollen levels are highest at this time.       Keep car windows closed when traveling.     Close house windows at night, use the air conditioning if necessary.     Check the pollen count to know what pollen is outside (http://aaaai.org/nab).       , Pet Avoidance     Keep pets  out of the bedroom at all times.     Keep pets off of furniture and other surfaces like counter tops.     More frequent bathing can help      Groom your pet outside so hair and dander stay outside the home when brushed.     Consider using HEPA air purifier in bedroom    , Dust mite control.            1.  Dust mite proof covers/encasing on the mattress, pillow and box spring.            2.  Wash sheets and bed linens in hot water each week.          3.  Vacuum carpets in bedroom each week.          4.  Dust or wipe down any hard surfaces.          5.  Avoid using a humidifier in the bedroom, and      Mold Avoidance.  Mold grows in damp or humid places.  The best way to avoid          environmental molds is to avoid places they grow such as compost piles, decaying          leaf piles and excavation sites.  If you know of any mold in your home, a dilute          bleach solution (1 cup bleach to one gallon of water) can help clean up the mold.            This should be done by a person who is not sensitive.  If there is a water leak, you          should have this fixed to prevent more moisture problems.            Limit Cigarette smoke exposure.  Smoking and second hand smoke can irritate the          nose and sinuses.  You and the people around you will benefit from avoiding          cigarette smoke.

## 2024-05-08 NOTE — PROGRESS NOTES
Marian Zhou presents for initial evaluation today.      Marian Zhou was seen at the request of Jose Ferreira MD for a chief complaint of allergies; a report with my findings is being sent via written or electronic means to Jose Ferreira MD with my recommendations for treatment    She provides the following history:  Ms. Zhou was referred from pulmonary for evaluation of allergic rhinitis.  She is followed by pulmonary for asthma and pulmonary sarcoidosis.  During evaluation, she had a respiratory allergy IgE panel in 2023 which was positive to tree, grass, weed, dust mite, cat, dog, cockroach.  Regarding rhinitis symptoms, she complains of itchy eyes, runny nose, sneezing, nasal congestion which has been present since her 20s.  She notes that symptoms are worse in the spring and the summer and with exposure to cat and dog (son has animals).  She is on Singulair.  She is not on an antihistamine or a nasal corticosteroid.  She notes that she previously had allergy eyedrops however the prescription has .    Asthma: She reports that she was diagnosed with asthma at age 55, however in retrospect is not clear if it was a started for pulmonary sarcoidosis.  She is currently on Symbicort 2 puffs twice daily without spacer.  She uses rescue albuterol approximately once weekly.       Eczema: Denies     Food allergy: Denies     Venom allergy: Denies     Drug allergy: Penicillin caused hives in her 20's after receiving a penicillin injection  Environmental History:  Type of home:  Apartment  Pets in the house: None  Mold or moisture in the home: None  Bedroom mitzy: Carpet  Dust mite covers on bed:  No  Cigarette exposure in the home:  No  Occupation/School: Retired, prior factory work     Pertinent Allergy/Immunology family history:  Daughter with asthma and allergies  All children, 3 daughters and son with allergic rhinitis   Denies family of autoimmunity     Review of Systems  "  Constitutional: Negative.    HENT: Negative.     Eyes: Negative.    Respiratory: Negative.     Cardiovascular: Negative.    Gastrointestinal: Negative.    Musculoskeletal: Negative.    Skin: Negative.    Allergic/Immunologic: Negative.    Hematological: Negative.        Vital signs:  /90 (BP Location: Right arm, Patient Position: Sitting)   Pulse 105   Temp 36.8 °C (98.2 °F) (Oral)   Ht 1.62 m (5' 3.78\")   Wt 133 kg (293 lb 3.4 oz)   BMI 50.68 kg/m²     Physical Exam:  GENERAL: Alert, oriented and in no acute distress.     HEENT: EYES: No conjunctival injection or cobblestoning. Nose: nasal turbinates mildly edematous and are not boggy.  There is no mucous stranding, polyps, or blood    noted. EARS: Tympanic membranes are clear. MOUTH: moist and pink with no exudates, ulcers, or thrush. NECK: is supple, without adenopathy.  No upper airway stridor noted.       HEART: regular rate and rhythm.       LUNGS: Clear to auscultation bilaterally. No wheezing, rhonchi or rales.        ABDOMEN: Positive bowel sounds, soft, nontender, nondistended.       EXTREMITIES: No clubbing or edema.        NEURO:  Normal affect.  Gait normal.      SKIN: No rash, hives, or angioedema noted    Environmental IgE testing    Lab Results   Component Value Date    ICIGE 161 11/07/2023    WHITEASH <0.10 11/07/2023    SILVERBIRCH <0.10 11/07/2023    BOXELDER <0.10 11/07/2023    MOUNTJUNIPER 0.18 (Equiv IgE) 11/07/2023    COTTONWOOD <0.10 11/07/2023    ELM 0.33 (Equiv IgE) 11/07/2023    MULBERRY <0.10 11/07/2023    PECANHICKORY 0.38 (Low) 11/07/2023    MAPLESYCAMOR <0.10 11/07/2023    WALNUT 0.12 (Equiv IgE) 11/07/2023    OAK <0.10 11/07/2023    BERMUDAGR 0.11 (Equiv IgE) 11/07/2023    JOHNSONGR 0.22 (Equiv IgE) 11/07/2023    BLUEGRASS 2.12 (Mod) 11/07/2023    TIMOTHYGRASS 1.85 (Mod) 11/07/2023     Lab Results   Component Value Date    LAMBQUART <0.10 11/07/2023    PIGWEED <0.10 11/07/2023    COMRAGWEED 0.70 (Mod) 11/07/2023    " RUSSIANT 0.19 (Equiv IgE) 11/07/2023    SHEEPSOR <0.10 11/07/2023    PLANTAIN <0.10 11/07/2023    CATEPI 0.37 (Low) 11/07/2023    DOGEPI 0.56 (Low) 11/07/2023    ALTERNA 2.55 (Mod) 11/07/2023    CLADHERB <0.10 11/07/2023    ICA04 <0.10 11/07/2023    PENICILLIUM <0.10 11/07/2023    DERMFAR 0.66 (Low) 11/07/2023    DERMPTE 0.54 (Low) 11/07/2023    COCKR 0.80 (Mod) 11/07/2023       Impression:  1. Seasonal allergic rhinitis due to pollen    2. Allergic conjunctivitis of both eyes    3. Allergic rhinitis due to animal hair and dander    4. Allergic rhinitis due to dust mite    5. Moderate persistent asthma without complication ([facility])      Assessment and Plan:  Allergic rhinitis, seasonal and perennial: Environmental allergen specific IgE panel in 11/23 was positive to tree, grass, weed, mold, dust mite, cockroach, cat, dog.  We reviewed environmental allergen avoidance measures.  Recommend starting levocetirizine 5 mg once daily as needed, and Flonase 2 sprays each nostril daily.  We reviewed proper nasal spray administration technique.  Discussed FDA blackbox warning on Singulair for mood changes.  She would like to discontinue Singulair.    Allergic conjunctivitis: May use olopatadine 0.2% eyedrops 1 drop each eye daily as needed    Asthma, pulmonary sarcoidosis: On Symbicort 2 puffs twice daily, and as needed albuterol.  Follows with pulmonary.  She is not currently using a spacer, so we performed spacer teaching today, and spacer was prescribed.    Plan for follow-up in 6 months or sooner if needed

## 2024-05-28 ENCOUNTER — APPOINTMENT (OUTPATIENT)
Dept: NUTRITION | Facility: HOSPITAL | Age: 59
End: 2024-05-28
Payer: COMMERCIAL

## 2024-06-06 ENCOUNTER — APPOINTMENT (OUTPATIENT)
Dept: PULMONOLOGY | Facility: HOSPITAL | Age: 59
End: 2024-06-06
Payer: COMMERCIAL

## 2024-09-05 ENCOUNTER — HOSPITAL ENCOUNTER (OUTPATIENT)
Dept: RADIOLOGY | Facility: HOSPITAL | Age: 59
Discharge: HOME | End: 2024-09-05
Payer: COMMERCIAL

## 2024-09-05 DIAGNOSIS — D86.0 SARCOIDOSIS OF LUNG (MULTI): ICD-10-CM

## 2024-09-05 PROCEDURE — 71250 CT THORAX DX C-: CPT

## 2024-09-06 ENCOUNTER — TELEPHONE (OUTPATIENT)
Dept: CRITICAL CARE MEDICINE | Facility: HOSPITAL | Age: 59
End: 2024-09-06
Payer: COMMERCIAL

## 2024-09-06 DIAGNOSIS — D86.0 SARCOIDOSIS OF LUNG (MULTI): Primary | ICD-10-CM

## 2024-09-06 NOTE — TELEPHONE ENCOUNTER
Called patient about scan result.   New small nodules in lung parenchyma and mediastinal lymphadenopathy most probably due to sarcoidosis  Patient denies worsening shortness of breath or cough and feels well.   Will bring in for repeat spirometry/DLCO, and lab tests and clinic follow up to evaluate further management and possible initiation of sarcoidosis treatment based on further testing and patient evaluation. Repeat CT scan follow up to be discussed in clinic.  PCP referral for mild atherosclerotic disease

## 2024-09-26 ENCOUNTER — HOSPITAL ENCOUNTER (OUTPATIENT)
Dept: RESPIRATORY THERAPY | Facility: HOSPITAL | Age: 59
Discharge: HOME | End: 2024-09-26
Payer: COMMERCIAL

## 2024-09-26 ENCOUNTER — APPOINTMENT (OUTPATIENT)
Dept: PULMONOLOGY | Facility: HOSPITAL | Age: 59
End: 2024-09-26
Payer: COMMERCIAL

## 2024-09-26 DIAGNOSIS — D86.0 SARCOIDOSIS OF LUNG (MULTI): ICD-10-CM

## 2024-09-26 DIAGNOSIS — J98.6 ELEVATED HEMIDIAPHRAGM: ICD-10-CM

## 2024-09-26 LAB
MGC ASCENT PFT - FEV1 - PRE: 1.52
MGC ASCENT PFT - FEV1 - PREDICTED: 2.46
MGC ASCENT PFT - FVC - PRE: 1.83
MGC ASCENT PFT - FVC - PREDICTED: 3.06

## 2024-09-26 PROCEDURE — 94010 BREATHING CAPACITY TEST: CPT

## 2024-10-08 ENCOUNTER — APPOINTMENT (OUTPATIENT)
Dept: CARDIOLOGY | Facility: CLINIC | Age: 59
End: 2024-10-08
Payer: MEDICARE

## 2024-11-06 ENCOUNTER — TELEPHONE (OUTPATIENT)
Dept: ALLERGY | Facility: HOSPITAL | Age: 59
End: 2024-11-06
Payer: COMMERCIAL

## 2024-11-13 ENCOUNTER — APPOINTMENT (OUTPATIENT)
Dept: ALLERGY | Facility: HOSPITAL | Age: 59
End: 2024-11-13
Payer: COMMERCIAL

## 2025-07-20 ENCOUNTER — APPOINTMENT (OUTPATIENT)
Dept: RADIOLOGY | Facility: HOSPITAL | Age: 60
End: 2025-07-20
Payer: MEDICAID

## 2025-07-20 ENCOUNTER — HOSPITAL ENCOUNTER (EMERGENCY)
Facility: HOSPITAL | Age: 60
Discharge: HOME | End: 2025-07-20
Payer: MEDICAID

## 2025-07-20 VITALS
HEART RATE: 74 BPM | DIASTOLIC BLOOD PRESSURE: 98 MMHG | RESPIRATION RATE: 18 BRPM | OXYGEN SATURATION: 96 % | HEIGHT: 64 IN | TEMPERATURE: 97.2 F | WEIGHT: 293 LBS | SYSTOLIC BLOOD PRESSURE: 155 MMHG | BODY MASS INDEX: 50.02 KG/M2

## 2025-07-20 DIAGNOSIS — R06.2 WHEEZING: ICD-10-CM

## 2025-07-20 DIAGNOSIS — Z86.2 HISTORY OF SARCOIDOSIS: Primary | ICD-10-CM

## 2025-07-20 DIAGNOSIS — R05.9 COUGH, UNSPECIFIED TYPE: ICD-10-CM

## 2025-07-20 DIAGNOSIS — J06.9 UPPER RESPIRATORY TRACT INFECTION, UNSPECIFIED TYPE: ICD-10-CM

## 2025-07-20 LAB — SARS-COV-2 RNA RESP QL NAA+PROBE: NOT DETECTED

## 2025-07-20 PROCEDURE — 2500000002 HC RX 250 W HCPCS SELF ADMINISTERED DRUGS (ALT 637 FOR MEDICARE OP, ALT 636 FOR OP/ED): Mod: SE | Performed by: NURSE PRACTITIONER

## 2025-07-20 PROCEDURE — 87635 SARS-COV-2 COVID-19 AMP PRB: CPT | Performed by: NURSE PRACTITIONER

## 2025-07-20 PROCEDURE — 71045 X-RAY EXAM CHEST 1 VIEW: CPT

## 2025-07-20 PROCEDURE — 99285 EMERGENCY DEPT VISIT HI MDM: CPT | Performed by: NURSE PRACTITIONER

## 2025-07-20 PROCEDURE — 94640 AIRWAY INHALATION TREATMENT: CPT | Mod: 59

## 2025-07-20 PROCEDURE — 2500000001 HC RX 250 WO HCPCS SELF ADMINISTERED DRUGS (ALT 637 FOR MEDICARE OP): Mod: SE | Performed by: NURSE PRACTITIONER

## 2025-07-20 PROCEDURE — 71045 X-RAY EXAM CHEST 1 VIEW: CPT | Performed by: STUDENT IN AN ORGANIZED HEALTH CARE EDUCATION/TRAINING PROGRAM

## 2025-07-20 PROCEDURE — 99285 EMERGENCY DEPT VISIT HI MDM: CPT

## 2025-07-20 RX ORDER — PREDNISONE 20 MG/1
20 TABLET ORAL DAILY
Qty: 5 TABLET | Refills: 0 | Status: SHIPPED | OUTPATIENT
Start: 2025-07-20 | End: 2025-07-25

## 2025-07-20 RX ORDER — AZITHROMYCIN 500 MG/1
500 TABLET, FILM COATED ORAL ONCE
Status: COMPLETED | OUTPATIENT
Start: 2025-07-20 | End: 2025-07-20

## 2025-07-20 RX ORDER — AZITHROMYCIN 250 MG/1
250 TABLET, FILM COATED ORAL DAILY
Qty: 6 TABLET | Refills: 0 | Status: SHIPPED | OUTPATIENT
Start: 2025-07-20 | End: 2025-07-25

## 2025-07-20 RX ORDER — IPRATROPIUM BROMIDE AND ALBUTEROL SULFATE 2.5; .5 MG/3ML; MG/3ML
3 SOLUTION RESPIRATORY (INHALATION)
Status: COMPLETED | OUTPATIENT
Start: 2025-07-20 | End: 2025-07-20

## 2025-07-20 RX ORDER — ALBUTEROL SULFATE 90 UG/1
1-2 INHALANT RESPIRATORY (INHALATION) EVERY 6 HOURS PRN
Qty: 18 G | Refills: 0 | Status: SHIPPED | OUTPATIENT
Start: 2025-07-20 | End: 2025-08-19

## 2025-07-20 RX ADMIN — IPRATROPIUM BROMIDE AND ALBUTEROL SULFATE 3 ML: .5; 3 SOLUTION RESPIRATORY (INHALATION) at 12:15

## 2025-07-20 RX ADMIN — AZITHROMYCIN DIHYDRATE 500 MG: 500 TABLET ORAL at 12:01

## 2025-07-20 RX ADMIN — IPRATROPIUM BROMIDE AND ALBUTEROL SULFATE 3 ML: .5; 3 SOLUTION RESPIRATORY (INHALATION) at 12:01

## 2025-07-20 RX ADMIN — IPRATROPIUM BROMIDE AND ALBUTEROL SULFATE 3 ML: .5; 3 SOLUTION RESPIRATORY (INHALATION) at 12:00

## 2025-07-20 NOTE — ED PROVIDER NOTES
"HPI   Chief Complaint   Patient presents with   • Cough       59-year-old female presents today with cough x 3 days.  She denies history of asthma but she is audibly wheezing.  She denies chest pain.  She denies abdominal pain.  She denies nausea or vomiting.  She denies becoming diaphoretic.  She denies this.  She states today the cough is keeping her up at night.  Normally when she develops a cough like this with history of sarcoidosis she receives a Z-Dominic from her primary care physician and \"she is good to go\".  She tried to go to urgent care [place] where [place] but the urgent care was closed.  She tried to contact her physician but she could not get through to her physician on the Sunday.       used: No            Patient History   Medical History[1]  Surgical History[2]  Family History[3]  Social History[4]    Physical Exam   ED Triage Vitals [07/20/25 1107]   Temperature Heart Rate Respirations BP   36.2 °C (97.2 °F) 74 18 (!) 155/98      Pulse Ox Temp Source Heart Rate Source Patient Position   96 % Temporal -- Sitting      BP Location FiO2 (%)     Right arm --       Physical Exam  Constitutional:       Appearance: Normal appearance.   HENT:      Head: Normocephalic and atraumatic.      Right Ear: Tympanic membrane normal.      Left Ear: Tympanic membrane normal.      Nose: Nose normal.      Mouth/Throat:      Mouth: Mucous membranes are moist.     Eyes:      Extraocular Movements: Extraocular movements intact.      Pupils: Pupils are equal, round, and reactive to light.       Cardiovascular:      Rate and Rhythm: Normal rate and regular rhythm.      Pulses: Normal pulses.      Heart sounds: Normal heart sounds.   Pulmonary:      Effort: Pulmonary effort is normal.      Breath sounds: Wheezing present.   Abdominal:      Palpations: There is no mass.      Tenderness: There is no abdominal tenderness. There is no right CVA tenderness, left CVA tenderness, guarding or rebound.      " Hernia: No hernia is present.     Musculoskeletal:         General: Normal range of motion.     Skin:     General: Skin is warm.      Capillary Refill: Capillary refill takes less than 2 seconds.     Neurological:      General: No focal deficit present.      Mental Status: She is alert and oriented to person, place, and time.     Psychiatric:         Mood and Affect: Mood normal.         Behavior: Behavior normal.           ED Course & MDM   Diagnoses as of 07/20/25 1254   History of sarcoidosis   Upper respiratory tract infection, unspecified type   Cough, unspecified type   Wheezing                 No data recorded     Sarah Coma Scale Score: 15 (07/20/25 1108 : Celena Hatfield, ABRAHAM)                           Medical Decision Making  Patient had inspiratory wheezing.  She was coughing during exam.  She received a DuoNeb x 3.  She received her first of azithromycin.  Chest x-ray was ordered.    Patient responded well to DuoNeb x 3.  placed patient on prednisone 20 mg daily for 5 days, azithromycin Z-Dominic, and ProAir.  Return precautions reviewed.  If you develop chest pain or weakness or difficulty breathing she should immediately return to our emergency department.  There was no neurologic deficit.  There was no change in skin temperature or color.  Patient was swabbed for COVID and [facility]    Amount and/or Complexity of Data Reviewed  Radiology: ordered and independent interpretation performed. Decision-making details documented in ED Course.     Details: Chest x-ray is unchanged from November 9, 2023.  Sarcoidosis has not progressed        Procedure  Procedures               [1]  Past Medical History:  Diagnosis Date   • Other intervertebral disc displacement, lumbar region     Herniation of intervertebral disc of lumbar spine   • Personal history of other diseases of the musculoskeletal system and connective tissue     History of rheumatoid arthritis   • Sciatica, left side      Sciatica of left side   [2]  Past Surgical History:  Procedure Laterality Date   • BREAST BIOPSY Right    • OTHER SURGICAL HISTORY  09/14/2020    Tubal ligation bilateral   • OTHER SURGICAL HISTORY  09/14/2020    Carpal tunnel surgery   • OTHER SURGICAL HISTORY  09/14/2020    Knee surgery   [3]  Family History  Problem Relation Name Age of Onset   • Hypertension Mother          Essential   • Diabetes Mother     • Rheum arthritis Mother     • Rheum arthritis Daughter     [4]  Social History  Tobacco Use   • Smoking status: Never     Passive exposure: Never   • Smokeless tobacco: Never   Vaping Use   • Vaping status: Never Used   Substance Use Topics   • Alcohol use: Yes     Comment: ON OCCASSION   • Drug use: Not Currently      STEVE Mariano-CNP  07/20/25 0692

## 2025-08-18 ENCOUNTER — OFFICE VISIT (OUTPATIENT)
Dept: OBSTETRICS AND GYNECOLOGY | Facility: HOSPITAL | Age: 60
End: 2025-08-18
Payer: MEDICARE

## 2025-08-18 VITALS — SYSTOLIC BLOOD PRESSURE: 170 MMHG | BODY MASS INDEX: 52.01 KG/M2 | DIASTOLIC BLOOD PRESSURE: 98 MMHG | WEIGHT: 293 LBS

## 2025-08-18 DIAGNOSIS — Z01.419 WELL WOMAN EXAM: Primary | ICD-10-CM

## 2025-08-18 DIAGNOSIS — Z12.31 ENCOUNTER FOR SCREENING MAMMOGRAM FOR MALIGNANT NEOPLASM OF BREAST: ICD-10-CM

## 2025-08-18 DIAGNOSIS — N95.1 VAGINAL DRYNESS, MENOPAUSAL: ICD-10-CM

## 2025-08-18 DIAGNOSIS — R32 URINARY INCONTINENCE, UNSPECIFIED TYPE: ICD-10-CM

## 2025-08-18 PROCEDURE — 99396 PREV VISIT EST AGE 40-64: CPT

## 2025-08-18 PROCEDURE — 3080F DIAST BP >= 90 MM HG: CPT

## 2025-08-18 PROCEDURE — 99212 OFFICE O/P EST SF 10 MIN: CPT

## 2025-08-18 PROCEDURE — 1036F TOBACCO NON-USER: CPT

## 2025-08-18 PROCEDURE — 3077F SYST BP >= 140 MM HG: CPT

## 2025-08-18 RX ORDER — ESTRADIOL 2 MG/1
2 SYSTEM VAGINAL
Qty: 1 EACH | Refills: 3 | Status: SHIPPED | OUTPATIENT
Start: 2025-08-18 | End: 2026-08-18

## 2025-08-18 SDOH — ECONOMIC STABILITY: FOOD INSECURITY: WITHIN THE PAST 12 MONTHS, YOU WORRIED THAT YOUR FOOD WOULD RUN OUT BEFORE YOU GOT MONEY TO BUY MORE.: SOMETIMES TRUE

## 2025-08-18 SDOH — HEALTH STABILITY: PHYSICAL HEALTH: ON AVERAGE, HOW MANY MINUTES DO YOU ENGAGE IN EXERCISE AT THIS LEVEL?: 30 MIN

## 2025-08-18 SDOH — ECONOMIC STABILITY: FOOD INSECURITY: WITHIN THE PAST 12 MONTHS, THE FOOD YOU BOUGHT JUST DIDN'T LAST AND YOU DIDN'T HAVE MONEY TO GET MORE.: SOMETIMES TRUE

## 2025-08-18 SDOH — HEALTH STABILITY: PHYSICAL HEALTH: ON AVERAGE, HOW MANY DAYS PER WEEK DO YOU ENGAGE IN MODERATE TO STRENUOUS EXERCISE (LIKE A BRISK WALK)?: 7 DAYS

## 2025-08-18 ASSESSMENT — ENCOUNTER SYMPTOMS
GASTROINTESTINAL NEGATIVE: 0
RESPIRATORY NEGATIVE: 1
NEUROLOGICAL NEGATIVE: 1
PSYCHIATRIC NEGATIVE: 0
CONSTITUTIONAL NEGATIVE: 0
HEMATOLOGIC/LYMPHATIC NEGATIVE: 0
ALLERGIC/IMMUNOLOGIC NEGATIVE: 0
MUSCULOSKELETAL NEGATIVE: 0
CARDIOVASCULAR NEGATIVE: 0
EYES NEGATIVE: 0
ENDOCRINE NEGATIVE: 0

## 2025-08-18 ASSESSMENT — LIFESTYLE VARIABLES
HOW OFTEN DO YOU HAVE A DRINK CONTAINING ALCOHOL: MONTHLY OR LESS
AUDIT-C TOTAL SCORE: 1
HOW OFTEN DO YOU HAVE SIX OR MORE DRINKS ON ONE OCCASION: NEVER
SKIP TO QUESTIONS 9-10: 1
HOW MANY STANDARD DRINKS CONTAINING ALCOHOL DO YOU HAVE ON A TYPICAL DAY: 1 OR 2

## 2025-08-18 ASSESSMENT — SOCIAL DETERMINANTS OF HEALTH (SDOH)
IN THE PAST 12 MONTHS, HAS THE ELECTRIC, GAS, OIL, OR WATER COMPANY THREATENED TO SHUT OFF SERVICE IN YOUR HOME?: NO
HOW HARD IS IT FOR YOU TO PAY FOR THE VERY BASICS LIKE FOOD, HOUSING, MEDICAL CARE, AND HEATING?: NOT HARD AT ALL

## 2025-08-18 ASSESSMENT — PAIN SCALES - GENERAL: PAINLEVEL_OUTOF10: 7

## 2025-10-14 ENCOUNTER — APPOINTMENT (OUTPATIENT)
Dept: UROLOGY | Facility: CLINIC | Age: 60
End: 2025-10-14
Payer: MEDICAID